# Patient Record
Sex: FEMALE | Race: OTHER | Employment: FULL TIME | URBAN - METROPOLITAN AREA
[De-identification: names, ages, dates, MRNs, and addresses within clinical notes are randomized per-mention and may not be internally consistent; named-entity substitution may affect disease eponyms.]

---

## 2017-01-06 ENCOUNTER — OFFICE VISIT (OUTPATIENT)
Dept: FAMILY MEDICINE CLINIC | Facility: CLINIC | Age: 37
End: 2017-01-06

## 2017-01-06 VITALS
HEART RATE: 59 BPM | TEMPERATURE: 98 F | SYSTOLIC BLOOD PRESSURE: 105 MMHG | BODY MASS INDEX: 23 KG/M2 | WEIGHT: 136 LBS | DIASTOLIC BLOOD PRESSURE: 75 MMHG

## 2017-01-06 DIAGNOSIS — J01.01 ACUTE RECURRENT MAXILLARY SINUSITIS: Primary | ICD-10-CM

## 2017-01-06 PROCEDURE — 99213 OFFICE O/P EST LOW 20 MIN: CPT | Performed by: FAMILY MEDICINE

## 2017-01-06 PROCEDURE — 99212 OFFICE O/P EST SF 10 MIN: CPT | Performed by: FAMILY MEDICINE

## 2017-01-06 RX ORDER — FLUTICASONE PROPIONATE 50 MCG
2 SPRAY, SUSPENSION (ML) NASAL DAILY
Qty: 1 BOTTLE | Refills: 0 | Status: SHIPPED | OUTPATIENT
Start: 2017-01-06 | End: 2017-11-17

## 2017-01-06 RX ORDER — LEVOFLOXACIN 500 MG/1
500 TABLET, FILM COATED ORAL DAILY
Qty: 10 TABLET | Refills: 0 | Status: SHIPPED | OUTPATIENT
Start: 2017-01-06 | End: 2017-01-16

## 2017-01-06 NOTE — PROGRESS NOTES
HPI:    Patient ID: Micheline Chung is a 39year old female. HPI  Patient presents with:  URI  Headache and pressure. Review of Systems   Constitutional: Negative. HENT: Positive for congestion, ear pain, postnasal drip and sinus pressure.     Respirato

## 2017-05-25 RX ORDER — NORETHINDRONE ACETATE/ETHINYL ESTRADIOL AND FERROUS FUMARATE 1MG-20(21)
KIT ORAL
Qty: 28 TABLET | Refills: 2 | OUTPATIENT
Start: 2017-05-25

## 2017-09-05 ENCOUNTER — TELEPHONE (OUTPATIENT)
Dept: OBGYN CLINIC | Facility: CLINIC | Age: 37
End: 2017-09-05

## 2017-09-05 RX ORDER — NORETHINDRONE ACETATE AND ETHINYL ESTRADIOL 1MG-20(21)
1 KIT ORAL DAILY
Qty: 1 PACKAGE | Refills: 1 | Status: SHIPPED | OUTPATIENT
Start: 2017-09-05 | End: 2017-10-11

## 2017-09-05 NOTE — TELEPHONE ENCOUNTER
Can give 2 packs only -- needs to come in during 2nd pack for BP check w/ us & if normal may give 2 more packs to last until annual due

## 2017-09-05 NOTE — TELEPHONE ENCOUNTER
Pt was seen for annual exam on 12/23/16 and was given a RX x4 months for Loestrin Fe 1/20. Pt was told by [facility] to f/u in office for BP check before future refills would be addressed. Pt never RTC for BP check.  Pt stated she has been off of Loestrin since Ap

## 2017-09-05 NOTE — TELEPHONE ENCOUNTER
Pt advised of below recs per [facility] and states understanding. Pt requesting first appt or last appt of day preferably at or after 4pm. Available appts offered with last appt of the day at 3:45pm. Pt declined appts for week of 10/1.  Pt requesting appt for 10/

## 2017-09-05 NOTE — TELEPHONE ENCOUNTER
States that ONOFRE told pt to call back to speak to her about bc, to see how shes doing on it before she request a refill.

## 2017-10-06 NOTE — TELEPHONE ENCOUNTER
Called pt to notify we have to reschedule her b/p check appt on 10/9 at 3:45 pm because we do not have physicians in clinic that afternoon. Pt accepted to reschedule appt on Sat 10/7 at 10 am. Pt confirms she is currently on OCP active pills.  Apologized to

## 2017-10-07 ENCOUNTER — TELEPHONE (OUTPATIENT)
Dept: OBGYN CLINIC | Facility: CLINIC | Age: 37
End: 2017-10-07

## 2017-10-07 ENCOUNTER — NURSE ONLY (OUTPATIENT)
Dept: OBGYN CLINIC | Facility: CLINIC | Age: 37
End: 2017-10-07

## 2017-10-07 VITALS
BODY MASS INDEX: 23 KG/M2 | DIASTOLIC BLOOD PRESSURE: 71 MMHG | SYSTOLIC BLOOD PRESSURE: 103 MMHG | WEIGHT: 137.81 LBS | HEART RATE: 80 BPM

## 2017-10-07 DIAGNOSIS — Z01.30 BP CHECK: Primary | ICD-10-CM

## 2017-10-07 PROCEDURE — 99211 OFF/OP EST MAY X REQ PHY/QHP: CPT | Performed by: OBSTETRICS & GYNECOLOGY

## 2017-10-07 NOTE — PROGRESS NOTES
Patient came in today for a BP check, patient is taking OCP, patient stated she felt fine, denies any headaches or dizziness, pt's BP reading was 103/71 today,patient also states that she needed a refill for OCP for the rest of the year pt has enough to co

## 2017-10-11 RX ORDER — NORETHINDRONE ACETATE AND ETHINYL ESTRADIOL 1MG-20(21)
1 KIT ORAL DAILY
Qty: 3 PACKAGE | Refills: 0 | Status: SHIPPED | OUTPATIENT
Start: 2017-10-11 | End: 2017-11-17

## 2017-10-11 NOTE — TELEPHONE ENCOUNTER
3 packs of OCPs sent to pt's pharmacy. Pt aware to come back in December for annual to get one year refill.

## 2017-11-17 ENCOUNTER — OFFICE VISIT (OUTPATIENT)
Dept: OBGYN CLINIC | Facility: CLINIC | Age: 37
End: 2017-11-17

## 2017-11-17 VITALS
BODY MASS INDEX: 23.73 KG/M2 | WEIGHT: 139 LBS | HEART RATE: 64 BPM | DIASTOLIC BLOOD PRESSURE: 81 MMHG | HEIGHT: 64.25 IN | SYSTOLIC BLOOD PRESSURE: 124 MMHG

## 2017-11-17 DIAGNOSIS — Z01.419 ENCOUNTER FOR GYNECOLOGICAL EXAMINATION WITHOUT ABNORMAL FINDING: Primary | ICD-10-CM

## 2017-11-17 DIAGNOSIS — Z30.41 ORAL CONTRACEPTIVE PILL SURVEILLANCE: ICD-10-CM

## 2017-11-17 PROCEDURE — 99395 PREV VISIT EST AGE 18-39: CPT | Performed by: OBSTETRICS & GYNECOLOGY

## 2017-11-17 RX ORDER — NORETHINDRONE ACETATE AND ETHINYL ESTRADIOL 1MG-20(21)
1 KIT ORAL DAILY
Qty: 1 PACKAGE | Refills: 12 | Status: SHIPPED | OUTPATIENT
Start: 2017-11-17 | End: 2018-12-11

## 2017-11-17 RX ORDER — PYRIDOXINE HCL (VITAMIN B6) 50 MG
TABLET ORAL
COMMUNITY

## 2017-11-17 NOTE — PROGRESS NOTES
Amanda Tidwell is a 44year old female [id number] Patient's last menstrual period was 11/03/2017. Patient presents with:  Gyn Exam: Annual -- never saw therapist. Wishes for names again. noted decrease in mood Sx when exercises  Refill Request: ocp refill  . daily., Disp: , Rfl:   •  Cyanocobalamin (B-12) 100 MCG Oral Tab, Take by mouth., Disp: , Rfl:   •  S-Adenosylmethionine (SAME OR), Take by mouth., Disp: , Rfl:   •  Norethin Ace-Eth Estrad-FE (LOESTRIN FE 1/20) 1-20 MG-MCG Oral Tab, Take 1 tablet by mouth or tenderness  Vagina:    normal appearance without lesions, no abnormal discharge  Cervix:    normal without tenderness on motion  Uterus:    normal in size, contour, position, mobility, without tenderness  Adnexa:   normal without masses or tenderness  P

## 2018-05-30 ENCOUNTER — OFFICE VISIT (OUTPATIENT)
Dept: FAMILY MEDICINE CLINIC | Facility: CLINIC | Age: 38
End: 2018-05-30

## 2018-05-30 VITALS
WEIGHT: 129 LBS | TEMPERATURE: 99 F | SYSTOLIC BLOOD PRESSURE: 106 MMHG | BODY MASS INDEX: 21.75 KG/M2 | HEART RATE: 74 BPM | HEIGHT: 64.5 IN | OXYGEN SATURATION: 98 % | DIASTOLIC BLOOD PRESSURE: 80 MMHG

## 2018-05-30 DIAGNOSIS — R09.82 POSTNASAL DRIP: Primary | ICD-10-CM

## 2018-05-30 DIAGNOSIS — R53.83 FATIGUE, UNSPECIFIED TYPE: ICD-10-CM

## 2018-05-30 PROCEDURE — 85018 HEMOGLOBIN: CPT

## 2018-05-30 PROCEDURE — 36416 COLLJ CAPILLARY BLOOD SPEC: CPT

## 2018-05-30 PROCEDURE — 36415 COLL VENOUS BLD VENIPUNCTURE: CPT

## 2018-05-30 PROCEDURE — 84443 ASSAY THYROID STIM HORMONE: CPT

## 2018-05-30 PROCEDURE — 99202 OFFICE O/P NEW SF 15 MIN: CPT

## 2018-05-30 NOTE — PROGRESS NOTES
HPI:    Patient ID: Merrill Martin is a 44year old female. Tired   This is a new problem. The current episode started yesterday. The problem occurs constantly. The problem has been unchanged.  Associated symptoms include anorexia, congestion, coughing and Disp:  Rfl:    Norethin Ace-Eth Estrad-FE (LOESTRIN FE 1/20) 1-20 MG-MCG Oral Tab Take 1 tablet by mouth daily.  Disp: 1 Package Rfl: 12     Allergies:No Known Allergies   PHYSICAL EXAM:   Physical Exam   Constitutional: She is oriented to person, place, an

## 2018-12-06 RX ORDER — NORETHINDRONE ACETATE/ETHINYL ESTRADIOL AND FERROUS FUMARATE 1MG-20(21)
KIT ORAL
Qty: 28 TABLET | Refills: 11 | OUTPATIENT
Start: 2018-12-06

## 2018-12-11 RX ORDER — NORETHINDRONE ACETATE AND ETHINYL ESTRADIOL 1MG-20(21)
1 KIT ORAL DAILY
Qty: 84 TABLET | Refills: 0 | Status: SHIPPED | OUTPATIENT
Start: 2018-12-11 | End: 2019-01-14

## 2018-12-11 NOTE — TELEPHONE ENCOUNTER
LAST ANNUAL 11-17-17, LAST PAP 12-23-16 (NORMAL). ADVISED PT SHE NEEDS TO SCHEDULE AN ANNUAL EXAM AND ASSISTED HER WITH THE APPT.   PT AWARE WE WILL SEND AUTHORIZATION FOR 90 SUPPLY AND THAT SHE MUST KEEP HER APPT AS SHE WILL GET NO FURTHER REFILLS WITHOUT

## 2019-01-14 ENCOUNTER — OFFICE VISIT (OUTPATIENT)
Dept: OBGYN CLINIC | Facility: CLINIC | Age: 39
End: 2019-01-14
Payer: COMMERCIAL

## 2019-01-14 VITALS
WEIGHT: 132.63 LBS | SYSTOLIC BLOOD PRESSURE: 123 MMHG | HEART RATE: 64 BPM | DIASTOLIC BLOOD PRESSURE: 85 MMHG | BODY MASS INDEX: 22.64 KG/M2 | HEIGHT: 64 IN

## 2019-01-14 DIAGNOSIS — Z30.41 ORAL CONTRACEPTIVE PILL SURVEILLANCE: ICD-10-CM

## 2019-01-14 DIAGNOSIS — Z01.419 ENCOUNTER FOR GYNECOLOGICAL EXAMINATION WITHOUT ABNORMAL FINDING: Primary | ICD-10-CM

## 2019-01-14 PROCEDURE — 99395 PREV VISIT EST AGE 18-39: CPT | Performed by: OBSTETRICS & GYNECOLOGY

## 2019-01-14 RX ORDER — NORETHINDRONE ACETATE AND ETHINYL ESTRADIOL 1MG-20(21)
1 KIT ORAL DAILY
Qty: 84 TABLET | Refills: 3 | Status: SHIPPED | OUTPATIENT
Start: 2019-01-14 | End: 2020-01-25

## 2019-01-21 NOTE — PROGRESS NOTES
Tiara Matute is a 43year old female [id number] Patient's last menstrual period was 2019.  Patient presents with:  Gyn Exam: ANNUAL  Refill Request: BC  .    OBSTETRICS HISTORY:  OB History    Para Term  AB Living   2 2 2     2   SAB TAB Ec Not on file      FAMILY HISTORY:  Family History   Problem Relation Age of Onset   • Diabetes Maternal Grandmother    • Diabetes Maternal Grandfather    • Cancer Maternal Grandfather    • Diabetes Paternal Grandmother    • Breast Cancer Paternal Tess Blanca thyromegaly, no nodules, no adenopathy  Lymphatic: no abnormal supraclavicular or axillary adenopathy is noted  Breast:   normal without palpable masses, tenderness, asymmetry, nipple discharge, nipple retraction or skin changes  Abdomen:   soft, nontender

## 2020-01-06 ENCOUNTER — HOSPITAL ENCOUNTER (OUTPATIENT)
Age: 40
Discharge: STILL ADMITTED OR ADMINISTRATIVE | End: 2020-01-06
Payer: COMMERCIAL

## 2020-01-06 ENCOUNTER — OFFICE VISIT (OUTPATIENT)
Dept: INTERNAL MEDICINE CLINIC | Facility: CLINIC | Age: 40
End: 2020-01-06
Payer: COMMERCIAL

## 2020-01-06 ENCOUNTER — HOSPITAL ENCOUNTER (OUTPATIENT)
Dept: GENERAL RADIOLOGY | Facility: HOSPITAL | Age: 40
Discharge: STILL ADMITTED OR ADMINISTRATIVE | End: 2020-01-06
Attending: INTERNAL MEDICINE
Payer: COMMERCIAL

## 2020-01-06 VITALS
HEART RATE: 67 BPM | HEIGHT: 64 IN | DIASTOLIC BLOOD PRESSURE: 86 MMHG | BODY MASS INDEX: 22.13 KG/M2 | WEIGHT: 129.63 LBS | SYSTOLIC BLOOD PRESSURE: 130 MMHG | TEMPERATURE: 98 F

## 2020-01-06 DIAGNOSIS — M79.672 LEFT FOOT PAIN: Primary | ICD-10-CM

## 2020-01-06 DIAGNOSIS — M79.672 LEFT FOOT PAIN: ICD-10-CM

## 2020-01-06 DIAGNOSIS — J06.9 ACUTE URI: ICD-10-CM

## 2020-01-06 PROCEDURE — 99213 OFFICE O/P EST LOW 20 MIN: CPT | Performed by: INTERNAL MEDICINE

## 2020-01-06 PROCEDURE — 73630 X-RAY EXAM OF FOOT: CPT | Performed by: INTERNAL MEDICINE

## 2020-01-07 ENCOUNTER — TELEPHONE (OUTPATIENT)
Dept: INTERNAL MEDICINE CLINIC | Facility: CLINIC | Age: 40
End: 2020-01-07

## 2020-01-07 NOTE — TELEPHONE ENCOUNTER
Recommend ice application 3 times daily, OTC Aleve 220 mg 1-2 pills twice daily with food, and use of an Ace wrap.

## 2020-01-07 NOTE — PATIENT INSTRUCTIONS
Await results of left foot x-ray. Please rest and elevate your left foot, apply ice 2-3 times daily, and take ibuprofen as needed for pain relief. Treat respiratory symptoms with pseudoephedrine, warm salt water gargles and Robitussin as needed.   Call if

## 2020-01-07 NOTE — PROGRESS NOTES
Tony Crystal is a 35year old female. Patient presents with: Foot Pain: left foot  Cough    HPI:   On Saturday, 2 days ago, she slipped while going downstairs and injured her left foot.   Since that time she has had persistent swelling, bruising and pain a wheezes  EXTREMITIES: Purplish ecchymosis without significant soft tissue swelling affecting the lateral aspect of the dorsal surface of the left foot with diffuse moderate tenderness without palpable abnormality    ASSESSMENT AND PLAN:   1.  Left foot pain

## 2020-01-07 NOTE — TELEPHONE ENCOUNTER
Patient calling for x-ray results of left foot. Patient notified of x-ray results below. Patient however states she is still having a lot of pain in the left foot.    Patient as elevated the foot and is also applying an ace wrap as recommended yesterd

## 2020-01-07 NOTE — TELEPHONE ENCOUNTER
Result Notes for XR FOOT, COMPLETE (MIN 3 VIEWS), LEFT (CPT=73630)     Notes recorded by Robbi Foster MD on 1/7/2020 at 8:01 AM CST  X-ray left foot negative

## 2020-01-07 NOTE — TELEPHONE ENCOUNTER
Pt returned call, reviewed 1/7/20 orders by Dr Tracy Patton. Pt verbalized understanding and agreed with md plan.

## 2020-01-25 RX ORDER — NORETHINDRONE ACETATE AND ETHINYL ESTRADIOL AND FERROUS FUMARATE 1MG-20(21)
KIT ORAL
Qty: 84 TABLET | Refills: 0 | Status: SHIPPED | OUTPATIENT
Start: 2020-01-25 | End: 2020-02-17

## 2020-01-25 NOTE — TELEPHONE ENCOUNTER
LAST ANNUAL 12-14-19, LAST PAP 12-23-16 AND NEXT ANNUAL SCHEDULED 2-17-20. AUTHORIZATION FOR #84 SENT TO THE PHARMACY PER PROTOCOL.

## 2020-02-17 ENCOUNTER — OFFICE VISIT (OUTPATIENT)
Dept: OBGYN CLINIC | Facility: CLINIC | Age: 40
End: 2020-02-17
Payer: COMMERCIAL

## 2020-02-17 VITALS
SYSTOLIC BLOOD PRESSURE: 117 MMHG | HEIGHT: 65 IN | DIASTOLIC BLOOD PRESSURE: 82 MMHG | BODY MASS INDEX: 21.49 KG/M2 | WEIGHT: 129 LBS | HEART RATE: 76 BPM

## 2020-02-17 DIAGNOSIS — Z12.31 VISIT FOR SCREENING MAMMOGRAM: ICD-10-CM

## 2020-02-17 DIAGNOSIS — Z01.419 ENCOUNTER FOR GYNECOLOGICAL EXAMINATION WITHOUT ABNORMAL FINDING: Primary | ICD-10-CM

## 2020-02-17 PROCEDURE — 99396 PREV VISIT EST AGE 40-64: CPT | Performed by: OBSTETRICS & GYNECOLOGY

## 2020-02-17 RX ORDER — NORETHINDRONE ACETATE AND ETHINYL ESTRADIOL 1MG-20(21)
1 KIT ORAL
Qty: 84 TABLET | Refills: 3 | Status: SHIPPED | OUTPATIENT
Start: 2020-02-17 | End: 2021-03-23

## 2020-02-19 NOTE — PROGRESS NOTES
Chet Pizarro is a 35year old female [id number] Patient's last menstrual period was 2020. Patient presents with:  Gyn Exam: annual  Medication Request: ocp refill  .     OBSTETRICS HISTORY:  OB History    Para Term  AB Living   2 2 2     2 service: Not on file        Active member of club or organization: Not on file        Attends meetings of clubs or organizations: Not on file        Relationship status: Not on file      Intimate partner violence:        Fear of current or ex partner: Not any breast pain, lumps, or discharge  Neurological:    denies frequent severe headaches  Psychiatric:   denies depression or anxiety, thoughts of harming self or others  Heme/Lymph:    denies easy bruising or bleeding      PHYSICAL EXAM:   Blood pressure 1 Annual visits.

## 2020-02-21 ENCOUNTER — LAB ENCOUNTER (OUTPATIENT)
Dept: LAB | Facility: HOSPITAL | Age: 40
End: 2020-02-21
Attending: INTERNAL MEDICINE
Payer: COMMERCIAL

## 2020-02-21 DIAGNOSIS — G47.9 SLEEP DISORDER: Primary | ICD-10-CM

## 2020-02-21 DIAGNOSIS — Z00.00 ROUTINE GENERAL MEDICAL EXAMINATION AT A HEALTH CARE FACILITY: ICD-10-CM

## 2020-02-21 LAB
ALBUMIN SERPL-MCNC: 3.7 G/DL (ref 3.4–5)
ALBUMIN/GLOB SERPL: 1 {RATIO} (ref 1–2)
ALP LIVER SERPL-CCNC: 38 U/L (ref 37–98)
ALT SERPL-CCNC: 35 U/L (ref 13–56)
ANION GAP SERPL CALC-SCNC: 8 MMOL/L (ref 0–18)
AST SERPL-CCNC: 23 U/L (ref 15–37)
BASOPHILS # BLD AUTO: 0.03 X10(3) UL (ref 0–0.2)
BASOPHILS NFR BLD AUTO: 0.4 %
BILIRUB SERPL-MCNC: 0.5 MG/DL (ref 0.1–2)
BUN BLD-MCNC: 7 MG/DL (ref 7–18)
BUN/CREAT SERPL: 9.9 (ref 10–20)
CALCIUM BLD-MCNC: 9.1 MG/DL (ref 8.5–10.1)
CHLORIDE SERPL-SCNC: 107 MMOL/L (ref 98–112)
CHOLEST SMN-MCNC: 146 MG/DL (ref ?–200)
CO2 SERPL-SCNC: 24 MMOL/L (ref 21–32)
CREAT BLD-MCNC: 0.71 MG/DL (ref 0.55–1.02)
DEPRECATED RDW RBC AUTO: 43.8 FL (ref 35.1–46.3)
EOSINOPHIL # BLD AUTO: 0.09 X10(3) UL (ref 0–0.7)
EOSINOPHIL NFR BLD AUTO: 1.1 %
ERYTHROCYTE [DISTWIDTH] IN BLOOD BY AUTOMATED COUNT: 12.3 % (ref 11–15)
EST. AVERAGE GLUCOSE BLD GHB EST-MCNC: 103 MG/DL (ref 68–126)
GLOBULIN PLAS-MCNC: 3.6 G/DL (ref 2.8–4.4)
GLUCOSE BLD-MCNC: 85 MG/DL (ref 70–99)
HBA1C MFR BLD HPLC: 5.2 % (ref ?–5.7)
HCT VFR BLD AUTO: 38.6 % (ref 35–48)
HDLC SERPL-MCNC: 73 MG/DL (ref 40–59)
HGB BLD-MCNC: 13.3 G/DL (ref 12–16)
IMM GRANULOCYTES # BLD AUTO: 0.03 X10(3) UL (ref 0–1)
IMM GRANULOCYTES NFR BLD: 0.4 %
LDLC SERPL CALC-MCNC: 53 MG/DL (ref ?–100)
LYMPHOCYTES # BLD AUTO: 2.19 X10(3) UL (ref 1–4)
LYMPHOCYTES NFR BLD AUTO: 27.7 %
M PROTEIN MFR SERPL ELPH: 7.3 G/DL (ref 6.4–8.2)
MCH RBC QN AUTO: 33.9 PG (ref 26–34)
MCHC RBC AUTO-ENTMCNC: 34.5 G/DL (ref 31–37)
MCV RBC AUTO: 98.5 FL (ref 80–100)
MONOCYTES # BLD AUTO: 0.4 X10(3) UL (ref 0.1–1)
MONOCYTES NFR BLD AUTO: 5.1 %
NEUTROPHILS # BLD AUTO: 5.16 X10 (3) UL (ref 1.5–7.7)
NEUTROPHILS # BLD AUTO: 5.16 X10(3) UL (ref 1.5–7.7)
NEUTROPHILS NFR BLD AUTO: 65.3 %
NONHDLC SERPL-MCNC: 73 MG/DL (ref ?–130)
OSMOLALITY SERPL CALC.SUM OF ELEC: 285 MOSM/KG (ref 275–295)
PATIENT FASTING Y/N/NP: YES
PATIENT FASTING Y/N/NP: YES
PLATELET # BLD AUTO: 349 10(3)UL (ref 150–450)
POTASSIUM SERPL-SCNC: 4.3 MMOL/L (ref 3.5–5.1)
RBC # BLD AUTO: 3.92 X10(6)UL (ref 3.8–5.3)
SODIUM SERPL-SCNC: 139 MMOL/L (ref 136–145)
TRIGL SERPL-MCNC: 98 MG/DL (ref 30–149)
TSI SER-ACNC: 3.03 MIU/ML (ref 0.36–3.74)
VLDLC SERPL CALC-MCNC: 20 MG/DL (ref 0–30)
WBC # BLD AUTO: 7.9 X10(3) UL (ref 4–11)

## 2020-02-21 PROCEDURE — 85025 COMPLETE CBC W/AUTO DIFF WBC: CPT

## 2020-02-21 PROCEDURE — 80061 LIPID PANEL: CPT

## 2020-02-21 PROCEDURE — 84443 ASSAY THYROID STIM HORMONE: CPT

## 2020-02-21 PROCEDURE — 83036 HEMOGLOBIN GLYCOSYLATED A1C: CPT

## 2020-02-21 PROCEDURE — 80053 COMPREHEN METABOLIC PANEL: CPT

## 2020-02-21 PROCEDURE — 36415 COLL VENOUS BLD VENIPUNCTURE: CPT

## 2020-02-24 NOTE — PROGRESS NOTES
Claribel Handy is a 35year old female.   Patient presents with:  Establish Care      HPI:   Patient is a 48-year-old woman, comes as a new patient  C/C establish care  C/O sleep issues   Gyn dr Mariel Garza to [facility] in nov -works In the [address] [place] [address] color of urine, discharge, urinating frequently  MUS: No back pain, joint pain, muscle pain  NEURO: denies headaches , anxiety, depression    EXAM:   /89 (BP Location: Right arm, Patient Position: Sitting, Cuff Size: adult)   Pulse 68   Ht 5' 5\" (1.

## 2020-02-24 NOTE — PATIENT INSTRUCTIONS
Treating Anxiety Disorders with Therapy    If you have an anxiety disorder, you don’t have to suffer anymore. Treatment is available. Therapy (also called counseling) is often a helpful treatment for anxiety disorders.  With therapy, a specially trained darvin Therapy will help you feel better and teach you skills to help manage anxiety long term. But change doesn’t happen right away. It takes a commitment from you. And treatment only works if you learn to face the causes of your anxiety.  So, you might feel wors © 9135-4492 [employer] [address]. [address] [facility], [address] [place]. All rights reserved. This information is not intended as a substitute for professional medical care. Always follow your healthcare professional's instructions.         Cleta Libman · Generalized anxiety disorder. This causes constant worry that can greatly disrupt your life. Getting better  You may believe that nothing can help you. Or, you might fear what others may think. But most anxiety symptoms can be eased.  Having an anxiety ? Major life changes, both good (new baby or job promotion) and bad (loss of job or loss of loved one)  ? Overload: feeling that you have too many responsibilities and can't take care of all of them at once  ?  Feeling helpless or feeling that your problems © 8306-5543 [employer] [address]. [address] [facility], [facility] [place]. All rights reserved. This information is not intended as a substitute for professional medical care. Always follow your healthcare professional's instructions.

## 2021-01-27 ENCOUNTER — HOSPITAL ENCOUNTER (EMERGENCY)
Facility: HOSPITAL | Age: 41
Discharge: HOME | End: 2021-01-27
Payer: COMMERCIAL

## 2021-01-27 ENCOUNTER — APPOINTMENT (OUTPATIENT)
Dept: GENERAL RADIOLOGY | Facility: HOSPITAL | Age: 41
End: 2021-01-27
Payer: COMMERCIAL

## 2021-01-27 VITALS
HEIGHT: 64 IN | OXYGEN SATURATION: 100 % | SYSTOLIC BLOOD PRESSURE: 109 MMHG | TEMPERATURE: 99 F | DIASTOLIC BLOOD PRESSURE: 74 MMHG | BODY MASS INDEX: 22.88 KG/M2 | WEIGHT: 134 LBS | HEART RATE: 61 BPM | RESPIRATION RATE: 18 BRPM

## 2021-01-27 DIAGNOSIS — S82.041A CLOSED DISPLACED COMMINUTED FRACTURE OF RIGHT PATELLA, INITIAL ENCOUNTER: Primary | ICD-10-CM

## 2021-01-27 PROCEDURE — 73560 X-RAY EXAM OF KNEE 1 OR 2: CPT

## 2021-01-27 PROCEDURE — 99283 EMERGENCY DEPT VISIT LOW MDM: CPT

## 2021-01-27 PROCEDURE — 73562 X-RAY EXAM OF KNEE 3: CPT

## 2021-01-27 RX ORDER — HYDROCODONE BITARTRATE AND ACETAMINOPHEN 5; 325 MG/1; MG/1
1-2 TABLET ORAL EVERY 6 HOURS PRN
Qty: 14 TABLET | Refills: 0 | Status: ON HOLD | OUTPATIENT
Start: 2021-01-27 | End: 2021-02-01

## 2021-01-27 RX ORDER — HYDROCODONE BITARTRATE AND ACETAMINOPHEN 5; 325 MG/1; MG/1
1 TABLET ORAL ONCE
Status: COMPLETED | OUTPATIENT
Start: 2021-01-27 | End: 2021-01-27

## 2021-01-28 ENCOUNTER — TELEPHONE (OUTPATIENT)
Dept: ORTHOPEDICS CLINIC | Facility: CLINIC | Age: 41
End: 2021-01-28

## 2021-01-28 ENCOUNTER — OFFICE VISIT (OUTPATIENT)
Dept: ORTHOPEDICS CLINIC | Facility: CLINIC | Age: 41
End: 2021-01-28
Payer: COMMERCIAL

## 2021-01-28 VITALS — WEIGHT: 134 LBS | BODY MASS INDEX: 22.33 KG/M2 | HEIGHT: 65 IN

## 2021-01-28 DIAGNOSIS — S82.031A CLOSED DISPLACED TRANSVERSE FRACTURE OF RIGHT PATELLA, INITIAL ENCOUNTER: Primary | ICD-10-CM

## 2021-01-28 DIAGNOSIS — S82.041A CLOSED DISPLACED COMMINUTED FRACTURE OF RIGHT PATELLA, INITIAL ENCOUNTER: Primary | ICD-10-CM

## 2021-01-28 PROCEDURE — 99204 OFFICE O/P NEW MOD 45 MIN: CPT | Performed by: ORTHOPAEDIC SURGERY

## 2021-01-28 PROCEDURE — 3008F BODY MASS INDEX DOCD: CPT | Performed by: ORTHOPAEDIC SURGERY

## 2021-01-28 RX ORDER — IBUPROFEN 800 MG/1
800 TABLET ORAL EVERY 6 HOURS PRN
COMMUNITY
Start: 2021-01-09

## 2021-01-28 NOTE — ED PROVIDER NOTES
Patient Seen in: [facility] AND [facility] Emergency Department      History   Patient presents with:  Leg or Foot Injury    Stated Complaint: Mechanical Fall w/pain to RLE     HPI/Subjective:   HPI    34-year-old female presents to the emergency department afte Skin:     General: Skin is warm and dry. Capillary Refill: Capillary refill takes less than 2 seconds. Comments: Small abrasion to the right anterior knee   Neurological:      General: No focal deficit present. Mental Status: She is alert.

## 2021-01-28 NOTE — TELEPHONE ENCOUNTER
Type of surgery:  Right patella open reduction, internal fixation  Date: 2/1/2021  Location: [facility]  Medical Clearance: No - Per Dr Chinyere Morales     *Medical:      *Dental:      *Other:  Prior Authorization Status: Approved  Workers Comp:  Medacta/Jus:  Laura Sloan

## 2021-01-29 ENCOUNTER — LAB ENCOUNTER (OUTPATIENT)
Dept: LAB | Facility: HOSPITAL | Age: 41
End: 2021-01-29
Attending: ORTHOPAEDIC SURGERY
Payer: COMMERCIAL

## 2021-01-29 DIAGNOSIS — Z01.818 PREOP TESTING: ICD-10-CM

## 2021-01-29 NOTE — PROGRESS NOTES
NURSING INTAKE COMMENTS: Patient presents with:  Consult: right knee injury -- XR taken yesterday at [address] [place] [address] ED. Onset last night from slipping and falling at home in kitchen. Rates pain 8/10. Wearing  knee splint and has crutches.        HPI: This 39year old Grandmother    • Cancer Paternal Grandfather         stomach CA   • No Known Problems Father    • No Known Problems Mother    • No Known Problems Daughter    • No Known Problems Son        Social History    Occupational History      Not on file    Tobacco No calf tenderness or significant swelling. Neurological light touch and pinprick sensation intact throughout the lower extremities. Ankle dorsiflexion plantarflexion EHL knee extension and hip flexion strength are 5 out of 5 bilaterally. No clonus. immobilizer. Follow Up: No follow-ups on file.     Deb Varma MD

## 2021-01-30 LAB — SARS-COV-2 RNA RESP QL NAA+PROBE: NOT DETECTED

## 2021-01-31 NOTE — H&P
ORTHO SURGERY H&P  Sadie Villegas is a 38year old female. MRN is [id number]. CC: Right knee pain    HPI: 48-year-old female complains of right knee pain, injured her knee while playing with her children at home.   She struck the anterior aspect of her ri file        Relationship status: Not on file      Intimate partner violence        Fear of current or ex partner: Not on file        Emotionally abused: Not on file        Physically abused: Not on file        Forced sexual activity: Not on file    Other T kg)   LMP 01/06/2021   BMI 21.97 kg/m²     Physical Exam:   Alert, oriented, no acute distress. Well-nourished. Skin is intact. No erythema, or lesions. Small abrasion over the anterior aspect of her knee, no erythema no sign of infection.   Moderate circu fracture through the mid pole of the patella. There is a gap of about 1.7 centimeters between the distracted principal fragments. Displaced fragments demonstrate mild apex anterior angulation. No other fractures are noted.  SOFT TISSUES: Mild anterior so

## 2021-02-01 ENCOUNTER — PATIENT MESSAGE (OUTPATIENT)
Dept: ORTHOPEDICS CLINIC | Facility: CLINIC | Age: 41
End: 2021-02-01

## 2021-02-01 ENCOUNTER — APPOINTMENT (OUTPATIENT)
Dept: GENERAL RADIOLOGY | Facility: HOSPITAL | Age: 41
End: 2021-02-01
Attending: ORTHOPAEDIC SURGERY
Payer: COMMERCIAL

## 2021-02-01 ENCOUNTER — HOSPITAL ENCOUNTER (OUTPATIENT)
Facility: HOSPITAL | Age: 41
Setting detail: HOSPITAL OUTPATIENT SURGERY
Discharge: HOME | End: 2021-02-01
Attending: ORTHOPAEDIC SURGERY | Admitting: ORTHOPAEDIC SURGERY
Payer: COMMERCIAL

## 2021-02-01 ENCOUNTER — ANESTHESIA EVENT (OUTPATIENT)
Dept: SURGERY | Facility: HOSPITAL | Age: 41
End: 2021-02-01
Payer: COMMERCIAL

## 2021-02-01 ENCOUNTER — ANESTHESIA (OUTPATIENT)
Dept: SURGERY | Facility: HOSPITAL | Age: 41
End: 2021-02-01
Payer: COMMERCIAL

## 2021-02-01 VITALS
TEMPERATURE: 98 F | WEIGHT: 134 LBS | BODY MASS INDEX: 22.33 KG/M2 | DIASTOLIC BLOOD PRESSURE: 71 MMHG | RESPIRATION RATE: 16 BRPM | SYSTOLIC BLOOD PRESSURE: 115 MMHG | OXYGEN SATURATION: 99 % | HEIGHT: 65 IN | HEART RATE: 74 BPM

## 2021-02-01 DIAGNOSIS — Z01.818 PREOP TESTING: Primary | ICD-10-CM

## 2021-02-01 LAB — B-HCG UR QL: NEGATIVE

## 2021-02-01 PROCEDURE — 36415 COLL VENOUS BLD VENIPUNCTURE: CPT | Performed by: ORTHOPAEDIC SURGERY

## 2021-02-01 PROCEDURE — 64447 NJX AA&/STRD FEMORAL NRV IMG: CPT | Performed by: ORTHOPAEDIC SURGERY

## 2021-02-01 PROCEDURE — 76000 FLUOROSCOPY <1 HR PHYS/QHP: CPT | Performed by: ORTHOPAEDIC SURGERY

## 2021-02-01 PROCEDURE — 76942 ECHO GUIDE FOR BIOPSY: CPT | Performed by: STUDENT IN AN ORGANIZED HEALTH CARE EDUCATION/TRAINING PROGRAM

## 2021-02-01 PROCEDURE — 76942 ECHO GUIDE FOR BIOPSY: CPT | Performed by: ORTHOPAEDIC SURGERY

## 2021-02-01 PROCEDURE — 81025 URINE PREGNANCY TEST: CPT

## 2021-02-01 PROCEDURE — 0QSD04Z REPOSITION RIGHT PATELLA WITH INTERNAL FIXATION DEVICE, OPEN APPROACH: ICD-10-PCS | Performed by: ORTHOPAEDIC SURGERY

## 2021-02-01 PROCEDURE — 3E0T3BZ INTRODUCTION OF ANESTHETIC AGENT INTO PERIPHERAL NERVES AND PLEXI, PERCUTANEOUS APPROACH: ICD-10-PCS | Performed by: STUDENT IN AN ORGANIZED HEALTH CARE EDUCATION/TRAINING PROGRAM

## 2021-02-01 DEVICE — IMPLANTABLE DEVICE: Type: IMPLANTABLE DEVICE | Site: PATELLA | Status: FUNCTIONAL

## 2021-02-01 RX ORDER — DEXAMETHASONE SODIUM PHOSPHATE 10 MG/ML
INJECTION, SOLUTION INTRAMUSCULAR; INTRAVENOUS
Status: COMPLETED | OUTPATIENT
Start: 2021-02-01 | End: 2021-02-01

## 2021-02-01 RX ORDER — LIDOCAINE HYDROCHLORIDE 10 MG/ML
INJECTION, SOLUTION INFILTRATION; PERINEURAL
Status: COMPLETED | OUTPATIENT
Start: 2021-02-01 | End: 2021-02-01

## 2021-02-01 RX ORDER — SODIUM CHLORIDE, SODIUM LACTATE, POTASSIUM CHLORIDE, CALCIUM CHLORIDE 600; 310; 30; 20 MG/100ML; MG/100ML; MG/100ML; MG/100ML
INJECTION, SOLUTION INTRAVENOUS CONTINUOUS
Status: DISCONTINUED | OUTPATIENT
Start: 2021-02-01 | End: 2021-02-01

## 2021-02-01 RX ORDER — HYDROMORPHONE HYDROCHLORIDE 1 MG/ML
0.4 INJECTION, SOLUTION INTRAMUSCULAR; INTRAVENOUS; SUBCUTANEOUS EVERY 5 MIN PRN
Status: DISCONTINUED | OUTPATIENT
Start: 2021-02-01 | End: 2021-02-01

## 2021-02-01 RX ORDER — PHENYLEPHRINE HCL 10 MG/ML
VIAL (ML) INJECTION AS NEEDED
Status: DISCONTINUED | OUTPATIENT
Start: 2021-02-01 | End: 2021-02-01 | Stop reason: SURG

## 2021-02-01 RX ORDER — LIDOCAINE HYDROCHLORIDE 10 MG/ML
INJECTION, SOLUTION EPIDURAL; INFILTRATION; INTRACAUDAL; PERINEURAL AS NEEDED
Status: DISCONTINUED | OUTPATIENT
Start: 2021-02-01 | End: 2021-02-01 | Stop reason: SURG

## 2021-02-01 RX ORDER — OXYCODONE HYDROCHLORIDE AND ACETAMINOPHEN 5; 325 MG/1; MG/1
1 TABLET ORAL EVERY 4 HOURS PRN
Status: DISCONTINUED | OUTPATIENT
Start: 2021-02-01 | End: 2021-02-01

## 2021-02-01 RX ORDER — ACETAMINOPHEN 500 MG
1000 TABLET ORAL ONCE
Status: COMPLETED | OUTPATIENT
Start: 2021-02-01 | End: 2021-02-01

## 2021-02-01 RX ORDER — HYDROMORPHONE HYDROCHLORIDE 1 MG/ML
0.6 INJECTION, SOLUTION INTRAMUSCULAR; INTRAVENOUS; SUBCUTANEOUS EVERY 5 MIN PRN
Status: DISCONTINUED | OUTPATIENT
Start: 2021-02-01 | End: 2021-02-01

## 2021-02-01 RX ORDER — CEFAZOLIN SODIUM/WATER 2 G/20 ML
SYRINGE (ML) INTRAVENOUS AS NEEDED
Status: DISCONTINUED | OUTPATIENT
Start: 2021-02-01 | End: 2021-02-01 | Stop reason: SURG

## 2021-02-01 RX ORDER — MORPHINE SULFATE 4 MG/ML
2 INJECTION, SOLUTION INTRAMUSCULAR; INTRAVENOUS EVERY 10 MIN PRN
Status: DISCONTINUED | OUTPATIENT
Start: 2021-02-01 | End: 2021-02-01

## 2021-02-01 RX ORDER — HYDROCODONE BITARTRATE AND ACETAMINOPHEN 5; 325 MG/1; MG/1
2 TABLET ORAL AS NEEDED
Status: DISCONTINUED | OUTPATIENT
Start: 2021-02-01 | End: 2021-02-01

## 2021-02-01 RX ORDER — ONDANSETRON 2 MG/ML
INJECTION INTRAMUSCULAR; INTRAVENOUS AS NEEDED
Status: DISCONTINUED | OUTPATIENT
Start: 2021-02-01 | End: 2021-02-01 | Stop reason: SURG

## 2021-02-01 RX ORDER — PROCHLORPERAZINE EDISYLATE 5 MG/ML
5 INJECTION INTRAMUSCULAR; INTRAVENOUS ONCE AS NEEDED
Status: DISCONTINUED | OUTPATIENT
Start: 2021-02-01 | End: 2021-02-01

## 2021-02-01 RX ORDER — HALOPERIDOL 5 MG/ML
0.25 INJECTION INTRAMUSCULAR ONCE AS NEEDED
Status: DISCONTINUED | OUTPATIENT
Start: 2021-02-01 | End: 2021-02-01

## 2021-02-01 RX ORDER — HYDROMORPHONE HYDROCHLORIDE 1 MG/ML
INJECTION, SOLUTION INTRAMUSCULAR; INTRAVENOUS; SUBCUTANEOUS AS NEEDED
Status: DISCONTINUED | OUTPATIENT
Start: 2021-02-01 | End: 2021-02-01 | Stop reason: SURG

## 2021-02-01 RX ORDER — OXYCODONE HYDROCHLORIDE AND ACETAMINOPHEN 5; 325 MG/1; MG/1
1-2 TABLET ORAL EVERY 4 HOURS PRN
Qty: 30 TABLET | Refills: 0 | Status: SHIPPED | OUTPATIENT
Start: 2021-02-01 | End: 2021-03-11 | Stop reason: ALTCHOICE

## 2021-02-01 RX ORDER — ROPIVACAINE HYDROCHLORIDE 5 MG/ML
INJECTION, SOLUTION EPIDURAL; INFILTRATION; PERINEURAL
Status: COMPLETED | OUTPATIENT
Start: 2021-02-01 | End: 2021-02-01

## 2021-02-01 RX ORDER — MORPHINE SULFATE 10 MG/ML
6 INJECTION, SOLUTION INTRAMUSCULAR; INTRAVENOUS EVERY 10 MIN PRN
Status: DISCONTINUED | OUTPATIENT
Start: 2021-02-01 | End: 2021-02-01

## 2021-02-01 RX ORDER — HYDROCODONE BITARTRATE AND ACETAMINOPHEN 5; 325 MG/1; MG/1
1 TABLET ORAL AS NEEDED
Status: DISCONTINUED | OUTPATIENT
Start: 2021-02-01 | End: 2021-02-01

## 2021-02-01 RX ORDER — NALOXONE HYDROCHLORIDE 0.4 MG/ML
80 INJECTION, SOLUTION INTRAMUSCULAR; INTRAVENOUS; SUBCUTANEOUS AS NEEDED
Status: DISCONTINUED | OUTPATIENT
Start: 2021-02-01 | End: 2021-02-01

## 2021-02-01 RX ORDER — HYDROMORPHONE HYDROCHLORIDE 1 MG/ML
0.2 INJECTION, SOLUTION INTRAMUSCULAR; INTRAVENOUS; SUBCUTANEOUS EVERY 5 MIN PRN
Status: DISCONTINUED | OUTPATIENT
Start: 2021-02-01 | End: 2021-02-01

## 2021-02-01 RX ORDER — MIDAZOLAM HYDROCHLORIDE 1 MG/ML
INJECTION INTRAMUSCULAR; INTRAVENOUS
Status: COMPLETED | OUTPATIENT
Start: 2021-02-01 | End: 2021-02-01

## 2021-02-01 RX ORDER — DEXAMETHASONE SODIUM PHOSPHATE 4 MG/ML
VIAL (ML) INJECTION AS NEEDED
Status: DISCONTINUED | OUTPATIENT
Start: 2021-02-01 | End: 2021-02-01 | Stop reason: SURG

## 2021-02-01 RX ORDER — MORPHINE SULFATE 4 MG/ML
4 INJECTION, SOLUTION INTRAMUSCULAR; INTRAVENOUS EVERY 10 MIN PRN
Status: DISCONTINUED | OUTPATIENT
Start: 2021-02-01 | End: 2021-02-01

## 2021-02-01 RX ORDER — ONDANSETRON 2 MG/ML
4 INJECTION INTRAMUSCULAR; INTRAVENOUS ONCE AS NEEDED
Status: COMPLETED | OUTPATIENT
Start: 2021-02-01 | End: 2021-02-01

## 2021-02-01 RX ADMIN — CEFAZOLIN SODIUM/WATER 2 G: 2 G/20 ML SYRINGE (ML) INTRAVENOUS at 13:45:00

## 2021-02-01 RX ADMIN — PHENYLEPHRINE HCL 50 MCG: 10 MG/ML VIAL (ML) INJECTION at 14:17:00

## 2021-02-01 RX ADMIN — SODIUM CHLORIDE, SODIUM LACTATE, POTASSIUM CHLORIDE, CALCIUM CHLORIDE: 600; 310; 30; 20 INJECTION, SOLUTION INTRAVENOUS at 15:07:00

## 2021-02-01 RX ADMIN — ROPIVACAINE HYDROCHLORIDE 25 ML: 5 INJECTION, SOLUTION EPIDURAL; INFILTRATION; PERINEURAL at 13:25:00

## 2021-02-01 RX ADMIN — DEXAMETHASONE SODIUM PHOSPHATE 4 MG: 4 MG/ML VIAL (ML) INJECTION at 13:41:00

## 2021-02-01 RX ADMIN — LIDOCAINE HYDROCHLORIDE 50 MG: 10 INJECTION, SOLUTION EPIDURAL; INFILTRATION; INTRACAUDAL; PERINEURAL at 13:39:00

## 2021-02-01 RX ADMIN — HYDROMORPHONE HYDROCHLORIDE 0.5 MG: 1 INJECTION, SOLUTION INTRAMUSCULAR; INTRAVENOUS; SUBCUTANEOUS at 15:04:00

## 2021-02-01 RX ADMIN — MIDAZOLAM HYDROCHLORIDE 2 MG: 1 INJECTION INTRAMUSCULAR; INTRAVENOUS at 13:25:00

## 2021-02-01 RX ADMIN — DEXAMETHASONE SODIUM PHOSPHATE 4 MG: 10 INJECTION, SOLUTION INTRAMUSCULAR; INTRAVENOUS at 13:25:00

## 2021-02-01 RX ADMIN — LIDOCAINE HYDROCHLORIDE 5 ML: 10 INJECTION, SOLUTION INFILTRATION; PERINEURAL at 13:25:00

## 2021-02-01 RX ADMIN — ONDANSETRON 4 MG: 2 INJECTION INTRAMUSCULAR; INTRAVENOUS at 13:41:00

## 2021-02-01 NOTE — OPERATIVE REPORT
[facility]    PATIENT'S NAME: APRIL GARCIA   ATTENDING PHYSICIAN: Ariel Flores MD   OPERATING PHYSICIAN: Ariel Flores MD   PATIENT ACCOUNT#:   [id number]    LOCATION:  [address]  MEDICAL RECORD #:   [id number]       DATE Lazarus Heys inflated to 250 mmHg. A longitudinal incision was then made over the anterior aspect of the knee. This was a 6-inch incision centered over the patella. Subcutaneous flaps were fashioned. The fracture site was identified.   Periosteum overlying the anter Tourniquet was deflated. Attention was paid to hemostasis. The skin and subcutaneous layers were closed over the construct using combination of 2-0 Vicryl sutures and 3-0 nylon sutures in interrupted fashion.   Sterile dressing was applied followed by a k

## 2021-02-01 NOTE — ANESTHESIA PREPROCEDURE EVALUATION
Anesthesia PreOp Note    HPI:     Mikle Boas is a 38year old female who presents for preoperative consultation requested by: Antonia Henry MD    Date of Surgery: 2/1/2021    Procedure(s):  PATELLA OPEN REDUCTION INTERNAL FIXATION  Indication: right No Known Allergies    Family History   Problem Relation Age of Onset   • Diabetes Maternal Grandmother    • Diabetes Maternal Grandfather    • Cancer Maternal Grandfather    • Diabetes Paternal Grandmother    • Breast Cancer Paternal Grandmother    • Ova Exercise: Not Asked        Seat Belt: Not Asked        Special Diet: Not Asked        Stress Concern: Not Asked        Weight Concern: Not Asked    Social History Narrative      Not on file      Available pre-op labs reviewed.   Lab Results   Componen

## 2021-02-01 NOTE — ANESTHESIA PROCEDURE NOTES
Peripheral Block    Date/Time: 2/1/2021 1:25 PM  Performed by: Natty Singh MD  Authorized by: Natty Singh MD       General Information and Staff    Start Time:  2/1/2021 1:19 PM  End Time:  2/1/2021 1:25 PM  Anesthesiologist:  Sebastián Tyler

## 2021-02-01 NOTE — ANESTHESIA PROCEDURE NOTES
Airway  Urgency: elective      General Information and Staff    Patient location during procedure: OR  Anesthesiologist: Bora Gomez MD  Performed: anesthesiologist     Indications and Patient Condition  Indications for airway management: anesthe

## 2021-02-01 NOTE — ANESTHESIA POSTPROCEDURE EVALUATION
Patient: Hebert Truong    Procedure Summary     Date: 02/01/21 Room / Location: [facility] OR  / [facility] OR    Anesthesia Start: 0408 Anesthesia Stop: 5802    Procedure: PATELLA OPEN REDUCTION INTERNAL FIXATION (Right Knee) Diagnosis: (right patella fractu

## 2021-02-01 NOTE — OPERATIVE REPORT
Operative Note    Patient Name: Aliza Modi    Preoperative Diagnosis: right patella fracture    Postoperative Diagnosis: right patella fracture    Primary Surgeon: Lilliam Rothman MD     Assistant: Peter Morillo [facility]    Procedures: R patella fracture ORIF with

## 2021-02-02 ENCOUNTER — MOBILE ENCOUNTER (OUTPATIENT)
Dept: ORTHOPEDICS CLINIC | Facility: CLINIC | Age: 41
End: 2021-02-02

## 2021-02-02 NOTE — TELEPHONE ENCOUNTER
From: Tarik Modi  To: Julia Max MD  Sent: 2/1/2021 10:34 PM CST  Subject: Test Results Question    Hi  Would it be possible to see the image of the x-ray after the patellar surgery?   Thanks  Tenisha

## 2021-02-03 ENCOUNTER — TELEPHONE (OUTPATIENT)
Dept: ORTHOPEDICS CLINIC | Facility: CLINIC | Age: 41
End: 2021-02-03

## 2021-02-03 ENCOUNTER — HOSPITAL ENCOUNTER (OUTPATIENT)
Dept: ULTRASOUND IMAGING | Facility: HOSPITAL | Age: 41
Discharge: HOME | End: 2021-02-03
Attending: ORTHOPAEDIC SURGERY
Payer: COMMERCIAL

## 2021-02-03 DIAGNOSIS — M79.89 CALF SWELLING: Primary | ICD-10-CM

## 2021-02-03 DIAGNOSIS — M79.661 RIGHT CALF PAIN: ICD-10-CM

## 2021-02-03 DIAGNOSIS — M79.89 CALF SWELLING: ICD-10-CM

## 2021-02-03 PROCEDURE — 93971 EXTREMITY STUDY: CPT | Performed by: ORTHOPAEDIC SURGERY

## 2021-02-03 NOTE — TELEPHONE ENCOUNTER
S/w pt and she states last night her pain and swelling became severe. She states there is bruising, swelling and pain in the knee, calf, ankle and foot. She states her pain is 9-10/10 without meds and 4/10 with meds. She is resting and elevating and icing s

## 2021-02-03 NOTE — TELEPHONE ENCOUNTER
US called and US is negative for DVT. Pt notified of results. Went over yin's recommendations for pain and swelling again. Advised her to [facility] as needed.

## 2021-02-03 NOTE — TELEPHONE ENCOUNTER
Pt called stating pt had surgery on 2-1-21. Pt's knee is swollen. pt is having pain. Call transferred to the nurse.

## 2021-02-03 NOTE — PROGRESS NOTES
Patient called this evening because of concerns of some feeling of wetness underneath her dressing. She is status post left patella fracture ORIF yesterday. Since surgery she has had some increased swelling about her knee as well as increased pain.   She

## 2021-02-03 NOTE — TELEPHONE ENCOUNTER
Pt notified per Zeke's message. appt made today @ 330pm for [facility] at [facility]. Gave her directions to hospital and we will notify her of results.

## 2021-02-03 NOTE — TELEPHONE ENCOUNTER
Yes, pain and swelling are normal after surgery. Continue with percocet 1-2 q 4-6 hours, ibuprofen 800 mg TID. Continue with ice, 20 min throughout the day. Elevation of leg will help with pain and swelling, with 2-3 pillows.  If pt has increased pain and s

## 2021-02-10 ENCOUNTER — HOSPITAL ENCOUNTER (OUTPATIENT)
Dept: GENERAL RADIOLOGY | Facility: HOSPITAL | Age: 41
Discharge: HOME | End: 2021-02-10
Attending: ORTHOPAEDIC SURGERY
Payer: COMMERCIAL

## 2021-02-10 ENCOUNTER — OFFICE VISIT (OUTPATIENT)
Dept: ORTHOPEDICS CLINIC | Facility: CLINIC | Age: 41
End: 2021-02-10
Payer: COMMERCIAL

## 2021-02-10 DIAGNOSIS — Z47.89 ORTHOPEDIC AFTERCARE: ICD-10-CM

## 2021-02-10 DIAGNOSIS — S82.041D CLOSED DISPLACED COMMINUTED FRACTURE OF RIGHT PATELLA WITH ROUTINE HEALING, SUBSEQUENT ENCOUNTER: Primary | ICD-10-CM

## 2021-02-10 PROCEDURE — 99024 POSTOP FOLLOW-UP VISIT: CPT | Performed by: ORTHOPAEDIC SURGERY

## 2021-02-10 PROCEDURE — 73560 X-RAY EXAM OF KNEE 1 OR 2: CPT | Performed by: ORTHOPAEDIC SURGERY

## 2021-02-10 NOTE — PROGRESS NOTES
NURSING INTAKE COMMENTS: Patient presents with:  Post-Op: Right patella ORIF - 1st visit - had sx on 2/1/21 - states she is ok - has pain rated as 3-6/10 at all the time , mostly at night  - no numbness or tingling, has a lot of swelling on the ankle and f Problems Daughter    • No Known Problems Son        Social History    Occupational History      Not on file    Tobacco Use      Smoking status: Never Smoker      Smokeless tobacco: Never Used    Substance and Sexual Activity      Alcohol use: Yes        Fr strength are 5 out of 5 bilaterally. No clonus. Imaging:   Xr Knee (1 Or 2 Views), Right (cpt=73560)    Result Date: 1/27/2021  PROCEDURE: XR KNEE (1 OR 2 VIEWS), RIGHT (CPT=73560)  COMPARISON: None.   INDICATIONS: Right knee pain and swelling post fall [facility],  KNEE (1 OR 2 VIEWS), RIGHT (CPT=73560), 1/27/2021, 9:44 PM.  INDICATIONS: Right patella open reduction internal fixation under fluoroscopy. TECHNIQUE:   Intraoperative exam was performed. Total fluoroscopy time was 42.2 seconds.  A total of 2

## 2021-02-25 ENCOUNTER — OFFICE VISIT (OUTPATIENT)
Dept: ORTHOPEDICS CLINIC | Facility: CLINIC | Age: 41
End: 2021-02-25
Payer: COMMERCIAL

## 2021-02-25 ENCOUNTER — HOSPITAL ENCOUNTER (OUTPATIENT)
Dept: GENERAL RADIOLOGY | Facility: HOSPITAL | Age: 41
Discharge: HOME | End: 2021-02-25
Attending: ORTHOPAEDIC SURGERY
Payer: COMMERCIAL

## 2021-02-25 VITALS — BODY MASS INDEX: 22.66 KG/M2 | WEIGHT: 136 LBS | HEIGHT: 65 IN

## 2021-02-25 DIAGNOSIS — Z47.89 ORTHOPEDIC AFTERCARE: ICD-10-CM

## 2021-02-25 DIAGNOSIS — S82.041D CLOSED DISPLACED COMMINUTED FRACTURE OF RIGHT PATELLA WITH ROUTINE HEALING, SUBSEQUENT ENCOUNTER: Primary | ICD-10-CM

## 2021-02-25 PROCEDURE — 73560 X-RAY EXAM OF KNEE 1 OR 2: CPT | Performed by: ORTHOPAEDIC SURGERY

## 2021-02-25 PROCEDURE — 99024 POSTOP FOLLOW-UP VISIT: CPT | Performed by: ORTHOPAEDIC SURGERY

## 2021-02-25 PROCEDURE — 3008F BODY MASS INDEX DOCD: CPT | Performed by: ORTHOPAEDIC SURGERY

## 2021-02-26 ENCOUNTER — TELEPHONE (OUTPATIENT)
Dept: PHYSICAL THERAPY | Age: 41
End: 2021-02-26

## 2021-02-26 ENCOUNTER — TELEPHONE (OUTPATIENT)
Dept: PHYSICAL THERAPY | Facility: HOSPITAL | Age: 41
End: 2021-02-26

## 2021-03-03 ENCOUNTER — OFFICE VISIT (OUTPATIENT)
Dept: PHYSICAL THERAPY | Facility: HOSPITAL | Age: 41
End: 2021-03-03
Attending: ORTHOPAEDIC SURGERY
Payer: COMMERCIAL

## 2021-03-03 DIAGNOSIS — S82.041D CLOSED DISPLACED COMMINUTED FRACTURE OF RIGHT PATELLA WITH ROUTINE HEALING, SUBSEQUENT ENCOUNTER: ICD-10-CM

## 2021-03-03 PROCEDURE — 97161 PT EVAL LOW COMPLEX 20 MIN: CPT

## 2021-03-03 PROCEDURE — 97110 THERAPEUTIC EXERCISES: CPT

## 2021-03-03 NOTE — PROGRESS NOTES
POST-OP KNEE EVALUATION:   Referring Physician: Dr. Santiago Inman  Diagnosis:  Closed displaced comminuted fracture of right patella with routine healing, subsequent encounter (C09.150I)     Date of Service: 3/3/2021     PATIENT SUMMARY   Tenisha Modi is a 38 address the above impairments and reach functional goals. Precautions/MD order:  quadricep strength training, short arc extension exercises from 0 to 30 degrees.   Quad strengthening in terminal extension;  Begin AROM 0-30 degrees for 1 week, then 0-60 visits)   · Pt will maintain knee extension ROM to 0 deg to allow proper heel strike during gait and terminal knee extension in stance  · Pt will improve knee AROM flexion by 3-5 deg per week  improve functional ADL.    · Pt will improve quad strength by 1

## 2021-03-04 ENCOUNTER — OFFICE VISIT (OUTPATIENT)
Dept: PHYSICAL THERAPY | Facility: HOSPITAL | Age: 41
End: 2021-03-04
Attending: ORTHOPAEDIC SURGERY
Payer: COMMERCIAL

## 2021-03-04 PROCEDURE — 97110 THERAPEUTIC EXERCISES: CPT

## 2021-03-04 NOTE — PROGRESS NOTES
Dx: Closed displaced comminuted fracture of right patella with routine healing, subsequent encounter (S82.041D); ORIF R patella Sx on 02/01/21            Insurance (Authorized # of Visits):   [id number] Physician: Dr. Zain Rothman  Next MD vi ADL.   · Pt will be independent and compliant with comprehensive HEP to maintain progress achieved in PT.     Plan: Plan to continue skilled PT to  to address jt restrictions, restore ROM, and progress with strengthening ex for the R LE  to achieve goals as

## 2021-03-09 ENCOUNTER — OFFICE VISIT (OUTPATIENT)
Dept: PHYSICAL THERAPY | Facility: HOSPITAL | Age: 41
End: 2021-03-09
Attending: ORTHOPAEDIC SURGERY
Payer: COMMERCIAL

## 2021-03-09 PROCEDURE — 97110 THERAPEUTIC EXERCISES: CPT

## 2021-03-09 NOTE — PROGRESS NOTES
Dx: Closed displaced comminuted fracture of right patella with routine healing, subsequent encounter (S82.041D); ORIF R patella Sx on 02/01/21            Insurance (Authorized # of Visits):   [id number] Physician: Dr. Joyce Stallings MD vi progress achieved in PT. Plan: Plan to continue skilled PT to  to address jt restrictions, restore ROM, and progress with strengthening ex for the R LE  to achieve goals as outlined and to promote functional  ADL.    Date: 3/4/2021  TX#: 2/18 Date: 3/9/2

## 2021-03-11 ENCOUNTER — OFFICE VISIT (OUTPATIENT)
Dept: PHYSICAL THERAPY | Facility: HOSPITAL | Age: 41
End: 2021-03-11
Attending: ORTHOPAEDIC SURGERY
Payer: COMMERCIAL

## 2021-03-11 ENCOUNTER — OFFICE VISIT (OUTPATIENT)
Dept: ORTHOPEDICS CLINIC | Facility: CLINIC | Age: 41
End: 2021-03-11
Payer: COMMERCIAL

## 2021-03-11 ENCOUNTER — HOSPITAL ENCOUNTER (OUTPATIENT)
Dept: GENERAL RADIOLOGY | Facility: HOSPITAL | Age: 41
Discharge: HOME | End: 2021-03-11
Attending: ORTHOPAEDIC SURGERY
Payer: COMMERCIAL

## 2021-03-11 DIAGNOSIS — Z47.89 ORTHOPEDIC AFTERCARE: Primary | ICD-10-CM

## 2021-03-11 DIAGNOSIS — Z47.89 ORTHOPEDIC AFTERCARE: ICD-10-CM

## 2021-03-11 DIAGNOSIS — S82.041D CLOSED DISPLACED COMMINUTED FRACTURE OF RIGHT PATELLA WITH ROUTINE HEALING, SUBSEQUENT ENCOUNTER: ICD-10-CM

## 2021-03-11 PROCEDURE — 97140 MANUAL THERAPY 1/> REGIONS: CPT

## 2021-03-11 PROCEDURE — 73560 X-RAY EXAM OF KNEE 1 OR 2: CPT | Performed by: ORTHOPAEDIC SURGERY

## 2021-03-11 PROCEDURE — 99024 POSTOP FOLLOW-UP VISIT: CPT | Performed by: ORTHOPAEDIC SURGERY

## 2021-03-11 PROCEDURE — 97110 THERAPEUTIC EXERCISES: CPT

## 2021-03-11 NOTE — PROGRESS NOTES
Dx: Closed displaced comminuted fracture of right patella with routine healing, subsequent encounter (S82.041D); ORIF R patella Sx on 02/01/21            Insurance (Authorized # of Visits):   [id number] Physician: Dr. Corinne Stallings MD vi · Pt will be independent and compliant with comprehensive HEP to maintain progress achieved in PT.     Plan: Plan to continue skilled PT to  to address jt restrictions, restore ROM, and progress with strengthening ex for the R LE  to achieve goals as outl pumps,   -Calf stretch with towel.   HEP:  Review HEP:              Charges: TEx2, MM x1        Total Timed Treatment: 45 min  Total Treatment Time: 47 min

## 2021-03-11 NOTE — PROGRESS NOTES
NURSING INTAKE COMMENTS: Patient presents with:  Post-Op: S/p right patella ORIF, sx 2/1/21. Has pain and some stiffness at night. Denies any fevers, numbness or tingling. Started PT, sees some improvement in mobility. Pain scale 3-4/10 at night.        HPI Use      Vaping Use: Never used    Substance and Sexual Activity      Alcohol use: Yes        Comment: occasionally      Drug use: No      Sexual activity: Not on file       Review of Systems:  GENERAL: denies fevers, chills, night sweats, fatigue, uninten INDICATIONS: Z47.89 Orthopedic aftercare, right patella fracture, post ORIF. TECHNIQUE: 2 views were obtained. FINDINGS:  BONES: Status post ORIF patellar fracture. Fracture fragments remain in anatomic alignment.   Residual radiolucent fracture line is routine healing, subsequent encounter        Assessment: 5-week status post ORIF right patella fracture unchanged alignment    Plan: Patient is progressing postoperatively. Reviewed x-rays with her.   Progress her physical therapy, she has no restrictions

## 2021-03-16 ENCOUNTER — OFFICE VISIT (OUTPATIENT)
Dept: PHYSICAL THERAPY | Facility: HOSPITAL | Age: 41
End: 2021-03-16
Attending: ORTHOPAEDIC SURGERY
Payer: COMMERCIAL

## 2021-03-16 PROCEDURE — 97110 THERAPEUTIC EXERCISES: CPT

## 2021-03-16 PROCEDURE — 97140 MANUAL THERAPY 1/> REGIONS: CPT

## 2021-03-16 NOTE — PROGRESS NOTES
Dx: Closed displaced comminuted fracture of right patella with routine healing, subsequent encounter (S82.041D); ORIF R patella Sx on 02/01/21            Insurance (Authorized # of Visits):   [id number] Physician: Dr. Franki Stallings MD vi transfers, and stairs. · Pt will demonstrate increased hip ER/ABD strength to 3+/5 or better to perform stepping and squatting activities without excessive femoral IR/ADD  · Pt will improve SLS to >5-10s to promote functional ADL.    · Pt will be indepen planes, emphasis on inf glides for knee flexion  -STM around scar TE:  Without Brace on:  -SLR 15x with assist  -Hip abd ag gravity x15  -Hip add ag gravity x15  -Hip extwith pillow under stomach x15  -QS in long sitting 10x5 sec hold  -Long sitting calf s

## 2021-03-18 ENCOUNTER — OFFICE VISIT (OUTPATIENT)
Dept: PHYSICAL THERAPY | Facility: HOSPITAL | Age: 41
End: 2021-03-18
Attending: ORTHOPAEDIC SURGERY
Payer: COMMERCIAL

## 2021-03-18 PROCEDURE — 97110 THERAPEUTIC EXERCISES: CPT

## 2021-03-18 PROCEDURE — 97140 MANUAL THERAPY 1/> REGIONS: CPT

## 2021-03-18 NOTE — PROGRESS NOTES
Dx: Closed displaced comminuted fracture of right patella with routine healing, subsequent encounter (S82.041D); ORIF R patella Sx on 02/01/21            Insurance (Authorized # of Visits):   [id number] Physician: Dr. Gu Child  Next MD vi >5-10s to promote functional ADL. · Pt will be independent and compliant with comprehensive HEP to maintain progress achieved in PT.     Plan: Plan to continue skilled PT to  to address jt restrictions, restore ROM, and progress with strengthening ex for stomach x15  -QS in long sitting 10x5 sec hold  -Long sitting calf stretch with towel 4 x30 sec hold  -Supine QS 10 x5 sec hold  -Supine heel slide on sliding board 10x2 50 deg;    -Heel slides inc 65 deg after pat mobilization   -SAQ on black foam roll 15

## 2021-03-23 ENCOUNTER — OFFICE VISIT (OUTPATIENT)
Dept: PHYSICAL THERAPY | Facility: HOSPITAL | Age: 41
End: 2021-03-23
Attending: ORTHOPAEDIC SURGERY
Payer: COMMERCIAL

## 2021-03-23 PROCEDURE — 97110 THERAPEUTIC EXERCISES: CPT

## 2021-03-23 PROCEDURE — 97140 MANUAL THERAPY 1/> REGIONS: CPT

## 2021-03-23 RX ORDER — NORETHINDRONE ACETATE AND ETHINYL ESTRADIOL 1MG-20(21)
1 KIT ORAL DAILY
Qty: 1 PACKAGE | Refills: 0 | Status: SHIPPED | OUTPATIENT
Start: 2021-03-23 | End: 2021-04-14

## 2021-03-23 NOTE — PROGRESS NOTES
Dx: Closed displaced comminuted fracture of right patella with routine healing, subsequent encounter (S82.041D); ORIF R patella Sx on 02/01/21            Insurance (Authorized # of Visits):   [id number] Physician: Dr. Jihan Stallings MD vi will increase hip and knee strength to grossly 4/5 to promote functional gait, transfers, and stairs. -Not met   · Pt will demonstrate increased hip ER/ABD strength to 3+/5 or better to perform stepping and squatting activities without excessive femoral IR sec hold  -Supine heel slide on sliding board 10x2 50 deg;    -Heel slides inc 65 deg after pat mobilization   -SAQ on black foam roll 15 x5 sec hold   -Prone knee flex 10 x5 sec hold  -Prone hip ext x15  -Ankle pumps 20x  -Seated heel slides with towel 3 min  Total Treatment Time: 47 min

## 2021-03-23 NOTE — TELEPHONE ENCOUNTER
Received a request for birth control for Junel Fe 1/20. Pt has her next annual 4-10-21 with [place]. Last annual 2-17-20. Notes state next cotest 1/21. Pt did not do her mammogram, because she broke her leg. She states she had surgery for her leg 2-1-21. Okay for refill for birth control until her annual 4/2021?

## 2021-03-24 NOTE — TELEPHONE ENCOUNTER
Gave one pack only. Pt has had order since over one year. Broken leg was just 2 months ago. Give one pack in order to get mammogram done.  Can use condoms otherwise until seen

## 2021-03-25 ENCOUNTER — OFFICE VISIT (OUTPATIENT)
Dept: PHYSICAL THERAPY | Facility: HOSPITAL | Age: 41
End: 2021-03-25
Attending: ORTHOPAEDIC SURGERY
Payer: COMMERCIAL

## 2021-03-25 PROCEDURE — 97116 GAIT TRAINING THERAPY: CPT

## 2021-03-25 PROCEDURE — 97140 MANUAL THERAPY 1/> REGIONS: CPT

## 2021-03-25 PROCEDURE — 97110 THERAPEUTIC EXERCISES: CPT

## 2021-03-25 NOTE — PROGRESS NOTES
Dx: Closed displaced comminuted fracture of right patella with routine healing, subsequent encounter (S82.041D); ORIF R patella Sx on 02/01/21            Insurance (Authorized # of Visits):   [id number] Physician: Dr. Niurka Stallings MD vi improve functional ADL. -In progress  · Pt will improve quad strength by 1 muscle grad or more to promote functional gait and ultimately stairs. -Not met  · Pt will increase hip and knee strength to grossly 4/5 to promote functional gait, transfers, and st planes, emphasis on inf glides for knee flexion  -STM around scar   TE:  Without Brace on:  -SLR 15x with assist  -Hip abd ag gravity x15  -Hip add ag gravity x15  -Hip ext with pillow under stomach x15  -QS in long sitting 10x5 sec hold  -Long sitting howard sec holds/10x;   -Supine SLR 10x, ext lag noted.    -Supine heel slides on sliding board with pillow case 60-65 deg flex:10x  -PKB 10x: 60 deg flexion   MT: 8 min  Knee jt mob gr2,3:   -Tibiofemoral jt: posterior glide   -Patellofemoral jt: glides:  inferio

## 2021-03-30 ENCOUNTER — OFFICE VISIT (OUTPATIENT)
Dept: PHYSICAL THERAPY | Facility: HOSPITAL | Age: 41
End: 2021-03-30
Attending: ORTHOPAEDIC SURGERY
Payer: COMMERCIAL

## 2021-03-30 PROCEDURE — 97110 THERAPEUTIC EXERCISES: CPT

## 2021-03-30 PROCEDURE — 97140 MANUAL THERAPY 1/> REGIONS: CPT

## 2021-03-30 NOTE — PROGRESS NOTES
Dx: Closed displaced comminuted fracture of right patella with routine healing, subsequent encounter (S82.041D); ORIF R patella Sx on 02/01/21            Insurance (Authorized # of Visits):   [id number] Physician: Dr. Jihan Stallings MD vi stepping and squatting activities without excessive femoral IR/ADD-Not met  · Pt will improve SLS to >5-10s to promote functional ADL. -Not met   · Pt will be independent and compliant with comprehensive HEP to maintain progress achieved in PT.-In progress sitting 10x5 sec hold  -Long sitting calf stretch with towel 4 x30 sec hold  -Supine QS 10 x5 sec hold   -Heel slide 20 x1  -Heel slide 10 x5 with towel  -Seated LAQ 15 x5 sce hold  -Seated knee flexion stretch 3 x10 sec hold  hold   MT:   Patella  glides support on bars  - R hip/knee flex with L SLS; L hip/knee flex with R SLS:  15x ea             Review gait training         HEP:  Verbally prone knee flexion  Seated flex knee with hold HEP:  -QS  -Supine;  hip abd, hip add, hip ext, SLR  -Heel slide  -Ham

## 2021-03-31 ENCOUNTER — APPOINTMENT (OUTPATIENT)
Dept: PHYSICAL THERAPY | Facility: HOSPITAL | Age: 41
End: 2021-03-31
Attending: SOCIAL WORKER
Payer: COMMERCIAL

## 2021-04-01 ENCOUNTER — OFFICE VISIT (OUTPATIENT)
Dept: PHYSICAL THERAPY | Facility: HOSPITAL | Age: 41
End: 2021-04-01
Attending: ORTHOPAEDIC SURGERY
Payer: COMMERCIAL

## 2021-04-01 ENCOUNTER — TELEPHONE (OUTPATIENT)
Dept: PHYSICAL THERAPY | Facility: HOSPITAL | Age: 41
End: 2021-04-01

## 2021-04-01 PROCEDURE — 97110 THERAPEUTIC EXERCISES: CPT

## 2021-04-01 PROCEDURE — 97140 MANUAL THERAPY 1/> REGIONS: CPT

## 2021-04-01 NOTE — PROGRESS NOTES
Dx: Closed displaced comminuted fracture of right patella with routine healing, subsequent encounter (S82.041D); ORIF R patella Sx on 02/01/21            Insurance (Authorized # of Visits):   [id number] Physician: Dr. Roshan Scott  Next MD vi progress  · Pt will improve quad strength by 1 muscle grad or more to promote functional gait and ultimately stairs. -Not met  · Pt will increase hip and knee strength to grossly 4/5 to promote functional gait, transfers, and stairs. -Not met   · Pt will d with assist  -Hip abd ag gravity x15  -Hip add ag gravity x15  -Hip ext with pillow under stomach x15  -QS in long sitting 10x5 sec hold  -Long sitting calf stretch with towel 4 x30 sec hold  -Supine QS 10 x5 sec hold  -Heel slide 20 x1  -Heel slide 10 x5 case 60-65 deg flex:10x  -PKB 10x: 60 deg flexion   MT: 8 min  Knee jt mob gr2,3:   -Tibiofemoral jt: posterior glide   -Patellofemoral jt: glides:  inferior MT: 8 min  Knee jt mob gr2,3:   -Tibiofemoral jt: posterior glide   -Patellofemoral jt: glides:  I with R leg dangling:10 sec x10;   -supine SAQ over foam roller: 5 sec holds/10x;  -walking with exaggerated R hip/knee flexion with cane with ankle DF at HS.   -Sitting active  R knee flex with over pressure of other leg: 10 sec x5;   HEP:  -B LE partial s

## 2021-04-06 ENCOUNTER — OFFICE VISIT (OUTPATIENT)
Dept: PHYSICAL THERAPY | Facility: HOSPITAL | Age: 41
End: 2021-04-06
Attending: ORTHOPAEDIC SURGERY
Payer: COMMERCIAL

## 2021-04-06 PROCEDURE — 97110 THERAPEUTIC EXERCISES: CPT

## 2021-04-06 PROCEDURE — 97140 MANUAL THERAPY 1/> REGIONS: CPT

## 2021-04-06 NOTE — PROGRESS NOTES
Dx: Closed displaced comminuted fracture of right patella with routine healing, subsequent encounter (S82.041D); ORIF R patella Sx on 02/01/21            Insurance (Authorized # of Visits):   [id number] Physician: Dr. Maria Teresa Mishra  Next MD vi gait, transfers, and stairs. -Not met   · Pt will demonstrate increased hip ER/ABD strength to 3+/5 or better to perform stepping and squatting activities without excessive femoral IR/ADD-Not met  · Pt will improve SLS to >5-10s to promote functional ADL. - long sitting 10x5 sec hold  -Long sitting calf stretch with towel 4 x30 sec hold  -Supine QS 10 x5 sec hold  -Heel slide 20 x1  -Heel slide 10 x5 with towel   -Prone knee flexion 15x5 sec hold   -Prone quad stretch with other leg assist 5 x5 sec hold  -Sea glide   -Patellofemoral jt: glides:  inferior MT: 8 min  Knee jt mob gr2,3:   -Tibiofemoral jt: posterior glide   -Patellofemoral jt: glides:  Inferior    -TE 20 min  -Seated AROM R knee flex with foot on pillow case  -Seated AROM R knee flex with foot on -Seated on chair and right knee flex as far as go and lean forward  -Standing in II bars:  B LE partial squats with B UE supports and manual cues's for WB B LE x15             Review gait training         HEP:  Verbally prone knee flexion  Seated flex kne

## 2021-04-07 ENCOUNTER — OFFICE VISIT (OUTPATIENT)
Dept: PHYSICAL THERAPY | Facility: HOSPITAL | Age: 41
End: 2021-04-07
Attending: ORTHOPAEDIC SURGERY
Payer: COMMERCIAL

## 2021-04-07 PROCEDURE — 97140 MANUAL THERAPY 1/> REGIONS: CPT

## 2021-04-07 PROCEDURE — 97110 THERAPEUTIC EXERCISES: CPT

## 2021-04-07 NOTE — PROGRESS NOTES
Dx: Closed displaced comminuted fracture of right patella with routine healing, subsequent encounter (S82.041D); ORIF R patella Sx on 02/01/21            Insurance (Authorized # of Visits):   [id number] Physician: Dr. Yaneth Wade  Next MD vi · Pt will demonstrate increased hip ER/ABD strength to 3+/5 or better to perform stepping and squatting activities without excessive femoral IR/ADD-Not met  · Pt will improve SLS to >5-10s to promote functional ADL. -Not met   · Pt will be independent and stretch with assist 5 x5 sec hold  -QS in long sitting 10x5 sec hold  -Long sitting calf stretch with towel 4 x30 sec hold  -Supine QS 10 x5 sec hold   -Heel slide 20 x1  -Heel slide 10 x5 with towel  -Seated LAQ 15 x5 sce hold  -Seated knee flexion stretc bars:  -SLS R/L  with UE support on bars  - R hip/knee flex with L SLS; L hip/knee flex with R SLS:  15x ea         MT: 20 min   Knee jt mob gr2,3:   -Tibiofemoral jt: distraction; posterior glide in sitting with leg dangling   -Tibiofemoral jt: posterior flex as far as go and lean forward  -Standing in II bars:  B LE partial squats  with B UE supports and manual cues's for WB B LE x15  -Standing heel raises x15  -Standing hip abd/ext x10 ea  -Standing hamstring curl 10x5\"  -Stair knee stretch 5 x5\"

## 2021-04-08 ENCOUNTER — APPOINTMENT (OUTPATIENT)
Dept: PHYSICAL THERAPY | Facility: HOSPITAL | Age: 41
End: 2021-04-08
Attending: ORTHOPAEDIC SURGERY
Payer: COMMERCIAL

## 2021-04-08 ENCOUNTER — APPOINTMENT (OUTPATIENT)
Dept: PHYSICAL THERAPY | Facility: HOSPITAL | Age: 41
End: 2021-04-08
Payer: COMMERCIAL

## 2021-04-13 ENCOUNTER — OFFICE VISIT (OUTPATIENT)
Dept: PHYSICAL THERAPY | Facility: HOSPITAL | Age: 41
End: 2021-04-13
Attending: SOCIAL WORKER
Payer: COMMERCIAL

## 2021-04-13 PROCEDURE — 97110 THERAPEUTIC EXERCISES: CPT

## 2021-04-13 NOTE — PROGRESS NOTES
Dx: Closed displaced comminuted fracture of right patella with routine healing, subsequent encounter (S82.041D); ORIF R patella Sx on 02/01/21            Insurance (Authorized # of Visits):   [id number] Physician: Dr. Leticia Stallings MD vi promote functional ADL. -Not met   · Pt will be independent and compliant with comprehensive HEP to maintain progress achieved in PT.-In progress    Plan: Plan to continue skilled PT to right knee patellar mobilization streching and knee ROM, gait training sitting calf stretch with towel 4 x30 sec hold  -Supine QS 10 x5 sec hold   -Heel slide 20 x1  -Heel slide 10 x5 with towel  -Seated LAQ 15 x5 sce hold  -Seated knee flexion stretch 3 x10 sec hold  hold   MT:   Patella  glides all planes, emphasis on inf g hip/knee flex with R SLS:  15x ea         MT: 20 min   Knee jt mob gr2,3:   -Tibiofemoral jt: distraction; posterior glide in sitting with leg dangling   -Tibiofemoral jt: posterior glide in supine with knee in resting position/  knee flexed to 65 deg.    - squats  with B UE supports and manual cues's for WB B LE x15  -Standing heel raises x15  -Standing hip abd/ext x10 ea  -Standing hamstring curl 10x5\"  -Stair knee stretch 5 x5\" TE:  -Scifit full [place] x15 min with sit #9  -II bars heel raises x15  -CAND

## 2021-04-14 RX ORDER — NORETHINDRONE ACETATE AND ETHINYL ESTRADIOL AND FERROUS FUMARATE 1MG-20(21)
KIT ORAL
Qty: 2 PACKAGE | Refills: 0 | Status: SHIPPED | OUTPATIENT
Start: 2021-04-14 | End: 2021-05-12

## 2021-04-15 ENCOUNTER — OFFICE VISIT (OUTPATIENT)
Dept: PHYSICAL THERAPY | Facility: HOSPITAL | Age: 41
End: 2021-04-15
Attending: SOCIAL WORKER
Payer: COMMERCIAL

## 2021-04-15 PROCEDURE — 97110 THERAPEUTIC EXERCISES: CPT

## 2021-04-15 NOTE — PROGRESS NOTES
Dx: Closed displaced comminuted fracture of right patella with routine healing, subsequent encounter (S82.041D); ORIF R patella Sx on 02/01/21            Insurance (Authorized # of Visits):   [id number] Physician: Dr. Yaneth Wade  Next MD vi achieved in PT.-In progress    Plan: Continue to right knee patellar mobilization stretching, knee ROM, gait training and balance activity. Discussed patient POC with the PT.     Date: 4/1/21  Tx#: 10/18   AROM R knee flexion post stretching:  Seated: 72 de -Seated on chair and right knee flex as far as go and lean forward  -Standing in II bars:  B LE partial squats with B UE supports and manual cues's for WB B LE x15           MT:   --Tibiofemoral jt: posterior glide   -Patellofemoral jt: glides:  Inferior le 5-10x;       HEP:  -HS stretch  -Prone quad stretch  -Prone hip ext  -Heel raises  -Calf stretch                Charges: TEx3       Total Timed Treatment: 45 min  Total Treatment Time: 46 min

## 2021-04-19 ENCOUNTER — TELEPHONE (OUTPATIENT)
Dept: ORTHOPEDICS CLINIC | Facility: CLINIC | Age: 41
End: 2021-04-19

## 2021-04-20 ENCOUNTER — APPOINTMENT (OUTPATIENT)
Dept: PHYSICAL THERAPY | Facility: HOSPITAL | Age: 41
End: 2021-04-20
Attending: SOCIAL WORKER
Payer: COMMERCIAL

## 2021-04-20 NOTE — TELEPHONE ENCOUNTER
Pt calling to find out if she is able to get a dental implant? Pt. States that she had patellar surgery on 2/1/2021.

## 2021-04-21 ENCOUNTER — TELEPHONE (OUTPATIENT)
Dept: PHYSICAL THERAPY | Facility: HOSPITAL | Age: 41
End: 2021-04-21

## 2021-04-21 ENCOUNTER — OFFICE VISIT (OUTPATIENT)
Dept: PHYSICAL THERAPY | Facility: HOSPITAL | Age: 41
End: 2021-04-21
Payer: COMMERCIAL

## 2021-04-21 PROCEDURE — 97110 THERAPEUTIC EXERCISES: CPT

## 2021-04-21 PROCEDURE — 97140 MANUAL THERAPY 1/> REGIONS: CPT

## 2021-04-21 NOTE — PROGRESS NOTES
ProgressSummary  Pt has attended 15/18 visits in Physical Therapy. Dx: Closed displaced comminuted fracture of right patella with routine healing, subsequent encounter (S82.506D);    ORIF R patella Sx on 02/01/21            Insurance (Authorized # of improve knee AROM flexion by 3-5 deg per week  improve functional ADL.   Progressing  · Pt will improve quad strength by 1 muscle grad or more to promote functional gait and ultimately stairs. -Progressing  · Pt will increase hip and knee strength to grossl AROM R knee flexion post stretching:  Supine: 76 deg   Sittin deg  Prone: 67 deg Date: 21  Tx#:    AROM R knee flexion post stretching:  Supine: 90 deg   Sittin deg  Prone: 77 deg Date: 4/15/21  Tx#:    AROM R knee flexion post with towel roll under ankle 15x5\"  -Supine SLR with QS 15x   -Supine heel slides with towel assist 15 x5\"  -Prone knee flexion 10x5\"  -Prone quad stretch with LLE push 5 x10\"  -Seated AROM R knee flex with foot on pillow case followed by   overpressure min  -Tibiofemoral jt: distraction; posterior glide in sitting with leg dangling   -Tibiofemoral jt: posterior glide in supine with knee in resting position/  knee flexed to 65 deg.   -Patellofemoral jt: glides:  inferior     Jt mob R ankle gr3:    - TCJ

## 2021-04-22 ENCOUNTER — OFFICE VISIT (OUTPATIENT)
Dept: ORTHOPEDICS CLINIC | Facility: CLINIC | Age: 41
End: 2021-04-22
Payer: COMMERCIAL

## 2021-04-22 ENCOUNTER — HOSPITAL ENCOUNTER (OUTPATIENT)
Dept: GENERAL RADIOLOGY | Facility: HOSPITAL | Age: 41
Discharge: HOME | End: 2021-04-22
Attending: ORTHOPAEDIC SURGERY
Payer: COMMERCIAL

## 2021-04-22 ENCOUNTER — OFFICE VISIT (OUTPATIENT)
Dept: PHYSICAL THERAPY | Facility: HOSPITAL | Age: 41
End: 2021-04-22
Attending: SOCIAL WORKER
Payer: COMMERCIAL

## 2021-04-22 DIAGNOSIS — Z47.89 ORTHOPEDIC AFTERCARE: Primary | ICD-10-CM

## 2021-04-22 DIAGNOSIS — S82.041D CLOSED DISPLACED COMMINUTED FRACTURE OF RIGHT PATELLA WITH ROUTINE HEALING, SUBSEQUENT ENCOUNTER: ICD-10-CM

## 2021-04-22 DIAGNOSIS — Z47.89 ORTHOPEDIC AFTERCARE: ICD-10-CM

## 2021-04-22 PROCEDURE — 97140 MANUAL THERAPY 1/> REGIONS: CPT

## 2021-04-22 PROCEDURE — 99024 POSTOP FOLLOW-UP VISIT: CPT | Performed by: ORTHOPAEDIC SURGERY

## 2021-04-22 PROCEDURE — 97110 THERAPEUTIC EXERCISES: CPT

## 2021-04-22 PROCEDURE — 73560 X-RAY EXAM OF KNEE 1 OR 2: CPT | Performed by: ORTHOPAEDIC SURGERY

## 2021-04-22 NOTE — PROGRESS NOTES
NURSING INTAKE COMMENTS: Patient presents with:  Post-Op: Surgery on 2/1/21, Right patella ORIF. Patient states pain is 1-2/10, denies any fever or chills. HPI: This 38year old female presents today with complaints of right knee follow-up.   She is 2 loss/gain  SKIN: denies skin lesions, open sores, rash  HEENT:denies recent vision change, new nasal congestion,hearing loss, tinnitus, sore throat, headaches  RESPIRATORY: denies new shortness of breath, cough, asthma, wheezing  CARDIOVASCULAR: denies taylor VIEWS), RIGHT (CPT=73560);  Future  -     PHYSICAL THERAPY - INTERNAL    Closed displaced comminuted fracture of right patella with routine healing, subsequent encounter  -     PHYSICAL THERAPY - INTERNAL        Assessment: 2.5 months status post ORIF right

## 2021-04-22 NOTE — PROGRESS NOTES
Dx: Closed displaced comminuted fracture of right patella with routine healing, subsequent encounter (S82.041D); ORIF R patella Sx on 02/01/21            Insurance (Authorized # of Visits):   [id number] Physician: Dr. Lit Stallings MD vi flexion post stretching:  Seated: 72 deg  Supine: 72 deg Date: 21  Tx#:    AROM R knee flexion post stretching:  Supine: 75 deg   Seated: 75 deg Date: 21  Tx#:    AROM R knee flexion post stretching:  Supine: 76 deg   Sittin deg  Pr and right knee flex as far as go and lean forward  -Standing in II bars:  B LE partial squats with B UE supports and manual cues's for WB B LE x15           MT:   --Tibiofemoral jt: posterior glide   -Patellofemoral jt: glides:  Inferior  TE:  -Scifit half -II bars partial squats using  mirror for =WB    -Sitting: SAQ /LAQ: 5 sec/10x  -Supine A/PROM R knee   -SAQ with foam roller 10sec/10x  -Prone knee bend: 10x   -ll bars: 2\" step overs focusing on knee control with descending  -shuttle: b le partial squ

## 2021-04-23 ENCOUNTER — TELEPHONE (OUTPATIENT)
Dept: PHYSICAL THERAPY | Facility: HOSPITAL | Age: 41
End: 2021-04-23

## 2021-04-26 ENCOUNTER — OFFICE VISIT (OUTPATIENT)
Dept: PHYSICAL THERAPY | Facility: HOSPITAL | Age: 41
End: 2021-04-26
Attending: SOCIAL WORKER
Payer: COMMERCIAL

## 2021-04-26 PROCEDURE — 97110 THERAPEUTIC EXERCISES: CPT

## 2021-04-26 NOTE — PROGRESS NOTES
Dx: Closed displaced comminuted fracture of right patella with routine healing, subsequent encounter (S82.041D); ORIF R patella Sx on 02/01/21            Insurance (Authorized # of Visits):   [id number] Physician: Dr. Iván Handy  Next MD vi functional ADL. -MET  At least 15 sec. · Pt will be independent and compliant with comprehensive HEP to maintain progress achieved in PT.-Progressing    Plan:    Plan to continue jt mob R knee and ankle to increase ROM of knee flex/ext and ankle DF.  [place] slides with towel assist 10 x5\"  -Prone knee flexion x5  -Prone gentle quad stretch 5 x10\" with therapist assist  -Seated AROM R knee flex with foot on pillow case followed by   overpressure of other leg 10 sec x5\"     TE:  -Scifit full [place] x10 min w stretch L3 1 minute  -4 inch fwd/lat step up 10x ea with manual and verbal cues for knee over 2nd toe and keep the knee \"soft\"  -ascending / descending clinic stairs  4\" stairs reciprocally 4 steps x5;   -4 inch step- overs in ll bars with knee over 2nd

## 2021-04-29 ENCOUNTER — APPOINTMENT (OUTPATIENT)
Dept: PHYSICAL THERAPY | Facility: HOSPITAL | Age: 41
End: 2021-04-29
Attending: SOCIAL WORKER
Payer: COMMERCIAL

## 2021-04-29 ENCOUNTER — OFFICE VISIT (OUTPATIENT)
Dept: PHYSICAL THERAPY | Facility: HOSPITAL | Age: 41
End: 2021-04-29
Payer: COMMERCIAL

## 2021-04-29 PROCEDURE — 97110 THERAPEUTIC EXERCISES: CPT

## 2021-04-29 PROCEDURE — 97140 MANUAL THERAPY 1/> REGIONS: CPT

## 2021-04-30 NOTE — PROGRESS NOTES
Dx: Closed displaced comminuted fracture of right patella with routine healing, subsequent encounter (S82.041D); ORIF R patella Sx on 02/01/21            Insurance (Authorized # of Visits):   [id number] Physician: Dr. Roshan Scott  Next MD vi Plan to continue jt mob R knee and ankle to increase ROM of knee flex/ext and ankle DF. Continue progression of closed chain ex for  strengthening and balance/proprioception ex.       Date: 4/13/21  Tx#: 13/18   AROM R knee flexion post stretching:  Supine: sec/10x  -Supine A/PROM R knee   -SAQ with foam roller 10sec/10x  -Prone knee bend: 10x   -ll bars: 2\" step overs focusing on knee control with descending  -shuttle: b le partial squats 30#/25x;   MT: 25 min  -Tibiofemoral jt: distraction; posterior glide prone leg hang at home  -shuttle: b le partial squats 35#/25x;   MT: 15 min  -Tibiofemoral jt: distraction; posterior glide in sitting with leg dangling   -Tibiofemoral jt: posterior glide in supine with knee in resting position/  knee flexed to 65 deg.

## 2021-05-05 ENCOUNTER — OFFICE VISIT (OUTPATIENT)
Dept: PHYSICAL THERAPY | Facility: HOSPITAL | Age: 41
End: 2021-05-05
Payer: COMMERCIAL

## 2021-05-05 PROCEDURE — 97110 THERAPEUTIC EXERCISES: CPT

## 2021-05-05 NOTE — PROGRESS NOTES
Dx: Closed displaced comminuted fracture of right patella with routine healing, subsequent encounter (S82.041D); ORIF R patella Sx on 02/01/21            Insurance (Authorized # of Visits):   [id number] Physician: Dr. Carmelina Stallings MD vi    AROM R knee flexion post stretching:  Supine: 96 deg   Sittin deg  Prone: 90 deg Date: 21  Tx#: 15/18   AROM R knee Supine: 5-103 deg  Sittin-105 deg  Prone:  Deg 5-103 deg  Date: 21  Tx#: 15/18   AROM R knee Supine: 5-106 deg dangling   -Tibiofemoral jt: posterior glide in supine with knee in resting position/  knee flexed to 65 deg.   -Patellofemoral jt: glides:  inferior     Jt mob R ankle gr3:    - TCJ  distraction,  post talar glides; MWM for ankle DF;    - A-P glides proxi min

## 2021-05-07 ENCOUNTER — OFFICE VISIT (OUTPATIENT)
Dept: PHYSICAL THERAPY | Facility: HOSPITAL | Age: 41
End: 2021-05-07
Payer: COMMERCIAL

## 2021-05-07 PROCEDURE — 97110 THERAPEUTIC EXERCISES: CPT

## 2021-05-07 NOTE — PROGRESS NOTES
Dx: Closed displaced comminuted fracture of right patella with routine healing, subsequent encounter (S82.041D); ORIF R patella Sx on 02/01/21            Insurance (Authorized # of Visits):   [id number] Physician: Dr. [facility]  Next MD vi stretching:  Supine: 96 deg   Sittin deg  Prone: 90 deg Date: 21  Tx#: 15/18   AROM R knee Supine: 5-103 deg  Sittin-105 deg  Prone:  Deg 5-103 deg  Date: 21  Tx#: 15/18   AROM R knee Supine: 5-106 deg  Sittin-105 deg  Prone:  Deg jt: posterior glide in supine with knee in resting position/  knee flexed to 65 deg.   -Patellofemoral jt: glides:  inferior     Jt mob R ankle gr3:    - TCJ  distraction,  post talar glides; MWM for ankle DF;    - A-P glides proximal/distal tib/fib jts gr equal WB)  -Supine QS 15x5\"  -Supine heel slide x15             Neuro:   Vivica Shana board A/P x20  Vivica Shana board M/L x20 Neuro:   Vivica Shana board A/P x20  Vivica Shana board M/L x20    HEP:  -HS stretch  -Prone quad stretch  -Prone hip ext  -Heel raises  -Calf stre

## 2021-05-11 ENCOUNTER — OFFICE VISIT (OUTPATIENT)
Dept: PHYSICAL THERAPY | Facility: HOSPITAL | Age: 41
End: 2021-05-11
Payer: COMMERCIAL

## 2021-05-11 PROCEDURE — 97110 THERAPEUTIC EXERCISES: CPT

## 2021-05-11 NOTE — PROGRESS NOTES
Dx: Closed displaced comminuted fracture of right patella with routine healing, subsequent encounter (S82.041D); ORIF R patella Sx on 02/01/21            Insurance (Authorized # of Visits):   [id number] Physician: Dr. Roshan Scott  Next MD vi deg Date: 4/15/21  Tx#:    AROM R knee flexion post stretching:  Supine: 96 deg   Sittin deg  Prone: 90 deg Date: 21  Tx#: 15/18   AROM R knee Supine: 5-103 deg  Sittin-105 deg  Prone:  Deg 5-103 deg  Date: 21  Tx#: 15/18   AROM R 30#/25x;   MT: 25 min  -Tibiofemoral jt: distraction; posterior glide in sitting with leg dangling   -Tibiofemoral jt: posterior glide in supine with knee in resting position/  knee flexed to 65 deg.   -Patellofemoral jt: glides:  inferior     Jt matilda bauer -4 Inch anterior step down x20   -Bosu Fwd lunges x10 B  -Bosu lat lunges x10  -Squats x15 (after cue's improved equal WB)  -Supine QS 15x5\"  -Supine heel slide x15      TE:   -Stationary bike   x7min with sit #6  -Shuttle BLE's partial squats with 35#

## 2021-05-12 RX ORDER — NORETHINDRONE ACETATE AND ETHINYL ESTRADIOL AND FERROUS FUMARATE 1MG-20(21)
KIT ORAL
Qty: 28 TABLET | Refills: 0 | Status: SHIPPED | OUTPATIENT
Start: 2021-05-12 | End: 2021-06-07

## 2021-05-13 ENCOUNTER — OFFICE VISIT (OUTPATIENT)
Dept: PHYSICAL THERAPY | Facility: HOSPITAL | Age: 41
End: 2021-05-13
Payer: COMMERCIAL

## 2021-05-13 PROCEDURE — 97140 MANUAL THERAPY 1/> REGIONS: CPT

## 2021-05-13 PROCEDURE — 97110 THERAPEUTIC EXERCISES: CPT

## 2021-05-13 NOTE — PROGRESS NOTES
Dx: Closed displaced comminuted fracture of right patella with routine healing, subsequent encounter (S82.041D); ORIF R patella Sx on 02/01/21            Insurance (Authorized # of Visits):   [id number] Physician: Dr. Pb Stallings MD vi independent and compliant with comprehensive HEP to maintain progress achieved in PT.-Progressing    Plan:   Continue progression of closed chain ex for knee strengthening exercises and stabilization to improved functional mobility.  Discussed patient ABRAHAM clark #6  -Shuttle BLE's partial squats with 35# x4 min  -Shuttle SL squats with 25# x3 min  -Wall squats front of mirror 10x5\"   -Heel raises x20  -CODIE calf stretch L3 4x30\"  -6 inch fwd/latstep down x20 with VC's   -4 Inch anterior step down x20   -Bosu Fw

## 2021-05-18 ENCOUNTER — OFFICE VISIT (OUTPATIENT)
Dept: PHYSICAL THERAPY | Facility: HOSPITAL | Age: 41
End: 2021-05-18
Payer: COMMERCIAL

## 2021-05-18 PROCEDURE — 97110 THERAPEUTIC EXERCISES: CPT

## 2021-05-18 NOTE — PROGRESS NOTES
Dx: Closed displaced comminuted fracture of right patella with routine healing, subsequent encounter (S82.041D); ORIF R patella Sx on 02/01/21            Insurance (Authorized # of Visits):   [id number] Physician: Dr. Ken Proper  Next MD vi closed chain ex for knee strengthening exercises and stabilization to improved functional mobility. Discussed patient POC with the PT.     Date: 4/26/21  Tx#: 16/18   AROM R knee sitting : 5-105 deg;  Date: 5/5/21  Tx#: 17/18   AROM R knee sitting : 5-117 d min  -Wall squats front of mirror 10x5\"   -Heel raises x20  -CODIE calf stretch L3 4x30\"  -6 inch fwd/latstep down x20 with VC's   -4 Inch anterior step down x20   -Bosu Fwd lunges x10 B  -Bosu lat lunges x10  -Prone hang knee with 5# wt x5 min  -Prone k Time: 47 min

## 2021-05-20 ENCOUNTER — APPOINTMENT (OUTPATIENT)
Dept: PHYSICAL THERAPY | Facility: HOSPITAL | Age: 41
End: 2021-05-20
Payer: COMMERCIAL

## 2021-05-25 ENCOUNTER — OFFICE VISIT (OUTPATIENT)
Dept: PHYSICAL THERAPY | Facility: HOSPITAL | Age: 41
End: 2021-05-25
Payer: COMMERCIAL

## 2021-05-25 PROCEDURE — 97110 THERAPEUTIC EXERCISES: CPT

## 2021-05-25 NOTE — PROGRESS NOTES
Dx: Closed displaced comminuted fracture of right patella with routine healing, subsequent encounter (S82.041D); ORIF R patella Sx on 02/01/21            Insurance (Authorized # of Visits):   [id number] Physician: Dr. Tarsha Hardy  Next MD vi will be independent and compliant with comprehensive HEP to maintain progress achieved in PT.-Progressing    Plan:   Continue progression of closed chain ex for knee strengthening exercises and stabilization to improved functional mobility.  Discussed minesh cue's improved equal WB)  -Supine QS 15x5\"  -Supine heel slide x15      TE:   -Stationary bike   x7min with sit #6  -Shuttle BLE's partial squats with 35# x4 min  -Shuttle SL squats with 25# x3 min  -Wall squats front of mirror 10x5\"   -Heel raises x20 A/P x20  Teresita moreno M/L x20 Neuro:   Teresita moreno A/P x20  Teresita moreno M/L x20 Neuro:   Teresita moreno A/P x20  Teresita moreno M/L x20 Manual Therapy:  -Supine patellar mobilization superior and inferior glide (slight tight superior glide noted)   [employer]

## 2021-05-27 ENCOUNTER — OFFICE VISIT (OUTPATIENT)
Dept: PHYSICAL THERAPY | Facility: HOSPITAL | Age: 41
End: 2021-05-27
Payer: COMMERCIAL

## 2021-05-27 PROCEDURE — 97110 THERAPEUTIC EXERCISES: CPT

## 2021-05-27 NOTE — PROGRESS NOTES
Dx: Closed displaced comminuted fracture of right patella with routine healing, subsequent encounter (S82.041D); ORIF R patella Sx on 02/01/21            Insurance (Authorized # of Visits):   [id number] Physician: Dr. Carmelina Stallings MD vi achieved in PT.-Progressing    Plan:   Continue progression of closed chain ex for knee strengthening exercises and stabilization to improved functional mobility. Discussed patient POC with the PT.     Date: 5/11/21  Tx#: 21  AROM R knee  Supine: 2-125 deg min  -Wall squats 10x5 sec hold   -Fwd lunges 20ft x2  -Lateral lunges 20ft x2 (c/o lateral  side of knee slight pain)  -[place] walking x20 step  -8 inch fwd/lat step down with cue's for proper knee alienment x15 ea  -4 inch anterior step down x15 (c/o

## 2021-06-01 ENCOUNTER — APPOINTMENT (OUTPATIENT)
Dept: PHYSICAL THERAPY | Facility: HOSPITAL | Age: 41
End: 2021-06-01
Payer: COMMERCIAL

## 2021-06-02 ENCOUNTER — OFFICE VISIT (OUTPATIENT)
Dept: PHYSICAL THERAPY | Facility: HOSPITAL | Age: 41
End: 2021-06-02
Payer: COMMERCIAL

## 2021-06-02 PROCEDURE — 97110 THERAPEUTIC EXERCISES: CPT

## 2021-06-02 PROCEDURE — 97140 MANUAL THERAPY 1/> REGIONS: CPT

## 2021-06-02 NOTE — PROGRESS NOTES
ProgressSummary  Pt has attended 26 visits in Physical Therapy. Dx: Closed displaced comminuted fracture of right patella with routine healing, subsequent encounter (S88.557R);    ORIF R patella Sx on 02/01/21            Insurance (Authorized # of Visit closed chain activities with (+) trendelenburg B LE. Decreased control with descending stairs. Weak GMP. Continues with restricted motion R TCJ and STJ.   Good response to MT today for same with improved ability to descend stairs noted and improved abili services furnished under this plan of treatment and while under my care.     X___________________________________________________ Date____________________    Certification From: 5/6/1796  To:8/31/2021     Plan:  Plan to continue skilled PT to  to address rose hip abd with RTB 15x5\"  -S/L clams with RTB 15x5\" with TA brace  -Chair squats x15  -Monster walk with RTB 25ft x2  -Side stepping with RTB 25ft x2   TE:   -Stationary bike   x7min with sit #6  -Shuttle BLE's partial squats with 35# x3 min  -Shuttle SL s and functional activities with the re assessment.   -Reviewed HEP             Neuro:   Sariah Marie board A/P x20  Sariah Marie board M/L x20 Manual Therapy:  -Supine patellar mobilization superior and inferior glide (slight tight superior glide noted)   Manual Ther

## 2021-06-03 ENCOUNTER — HOSPITAL ENCOUNTER (OUTPATIENT)
Dept: GENERAL RADIOLOGY | Facility: HOSPITAL | Age: 41
Discharge: HOME | End: 2021-06-03
Attending: ORTHOPAEDIC SURGERY
Payer: COMMERCIAL

## 2021-06-03 ENCOUNTER — OFFICE VISIT (OUTPATIENT)
Dept: ORTHOPEDICS CLINIC | Facility: CLINIC | Age: 41
End: 2021-06-03
Payer: COMMERCIAL

## 2021-06-03 DIAGNOSIS — S82.041D CLOSED DISPLACED COMMINUTED FRACTURE OF RIGHT PATELLA WITH ROUTINE HEALING, SUBSEQUENT ENCOUNTER: Primary | ICD-10-CM

## 2021-06-03 DIAGNOSIS — Z47.89 ORTHOPEDIC AFTERCARE: ICD-10-CM

## 2021-06-03 PROCEDURE — 99213 OFFICE O/P EST LOW 20 MIN: CPT | Performed by: ORTHOPAEDIC SURGERY

## 2021-06-03 PROCEDURE — 73560 X-RAY EXAM OF KNEE 1 OR 2: CPT | Performed by: ORTHOPAEDIC SURGERY

## 2021-06-03 NOTE — PROGRESS NOTES
NURSING INTAKE COMMENTS: Patient presents with:  Fracture: right patella -- Rates pain 1/10 and denies any swelling. No pain meds taken. HPI: This 38year old female presents today with complaints of right knee surgery follow-up.   She is now 4 months Sexual Activity      Alcohol use: Yes        Comment: occasionally      Drug use: No      Sexual activity: Not on file       Review of Systems:  GENERAL: denies fevers, chills, night sweats, fatigue, unintentional weight loss/gain  SKIN: denies skin lesion the patella fracture. The hardware roro good position. There continues to be articular step-off of approximately 2 mm on the lateral view.     Labs:  Lab Results   Component Value Date    WBC 7.9 02/21/2020    HGB 13.3 02/21/2020    .0 02/21/2020

## 2021-06-07 RX ORDER — NORETHINDRONE ACETATE AND ETHINYL ESTRADIOL AND FERROUS FUMARATE 1MG-20(21)
KIT ORAL
Qty: 28 TABLET | Refills: 0 | Status: SHIPPED | OUTPATIENT
Start: 2021-06-07 | End: 2021-06-12

## 2021-06-08 ENCOUNTER — APPOINTMENT (OUTPATIENT)
Dept: PHYSICAL THERAPY | Facility: HOSPITAL | Age: 41
End: 2021-06-08
Payer: COMMERCIAL

## 2021-06-08 ENCOUNTER — TELEPHONE (OUTPATIENT)
Dept: PHYSICAL THERAPY | Facility: HOSPITAL | Age: 41
End: 2021-06-08

## 2021-06-10 ENCOUNTER — OFFICE VISIT (OUTPATIENT)
Dept: PHYSICAL THERAPY | Facility: HOSPITAL | Age: 41
End: 2021-06-10
Payer: COMMERCIAL

## 2021-06-10 PROCEDURE — 97110 THERAPEUTIC EXERCISES: CPT

## 2021-06-10 NOTE — PROGRESS NOTES
Dx: Closed displaced comminuted fracture of right patella with routine healing, subsequent encounter (S82.041D); ORIF R patella Sx on 02/01/21            Insurance (Authorized # of Visits):   [id number] Physician: Dr. Anne Stallings MD vi Met   · Pt will improve SLS to >5-10s to promote functional ADL. -MET  At least 30 sec now.    · Pt will be independent and compliant with comprehensive HEP to maintain progress achieved in PT.  -Progressing    Plan:  Plan to continue skilled PT to  to addre x20  -Hooklying hip abd with RTB 15x5\"  -BKFO with rTB x15  Bridging with hip abd with RTB 15x5\"  -S/L clams with RTB 15x5\" with TA brace  -Chair squats x15  -Monster walk with RTB 25ft x2  -Side stepping with RTB 25ft x2   TE:   -Stationary bike   x7mi : 2 min  -4 inch anterior step down: 10x;   -B LE partial squats  -ROM and functional activities with the re assessment.   -Reviewed HEP           E:   -Stationary bike   x7min with sit #6  -Shuttle BLE's partial squats with 35# x 4 min  -Shuttle SL squats

## 2021-06-11 ENCOUNTER — TELEPHONE (OUTPATIENT)
Dept: OBGYN CLINIC | Facility: CLINIC | Age: 41
End: 2021-06-11

## 2021-06-11 ENCOUNTER — APPOINTMENT (OUTPATIENT)
Dept: PHYSICAL THERAPY | Facility: HOSPITAL | Age: 41
End: 2021-06-11
Payer: COMMERCIAL

## 2021-06-12 ENCOUNTER — OFFICE VISIT (OUTPATIENT)
Dept: OBGYN CLINIC | Facility: CLINIC | Age: 41
End: 2021-06-12
Payer: COMMERCIAL

## 2021-06-12 VITALS
DIASTOLIC BLOOD PRESSURE: 72 MMHG | BODY MASS INDEX: 22 KG/M2 | WEIGHT: 132.81 LBS | SYSTOLIC BLOOD PRESSURE: 128 MMHG | HEART RATE: 63 BPM

## 2021-06-12 DIAGNOSIS — Z12.4 SCREENING FOR MALIGNANT NEOPLASM OF CERVIX: ICD-10-CM

## 2021-06-12 DIAGNOSIS — Z30.41 ORAL CONTRACEPTIVE PILL SURVEILLANCE: ICD-10-CM

## 2021-06-12 DIAGNOSIS — Z12.31 VISIT FOR SCREENING MAMMOGRAM: ICD-10-CM

## 2021-06-12 DIAGNOSIS — Z01.419 ENCOUNTER FOR GYNECOLOGICAL EXAMINATION WITHOUT ABNORMAL FINDING: Primary | ICD-10-CM

## 2021-06-12 PROCEDURE — 99396 PREV VISIT EST AGE 40-64: CPT | Performed by: OBSTETRICS & GYNECOLOGY

## 2021-06-12 PROCEDURE — 3078F DIAST BP <80 MM HG: CPT | Performed by: OBSTETRICS & GYNECOLOGY

## 2021-06-12 PROCEDURE — 3074F SYST BP LT 130 MM HG: CPT | Performed by: OBSTETRICS & GYNECOLOGY

## 2021-06-12 RX ORDER — NORETHINDRONE ACETATE AND ETHINYL ESTRADIOL 1MG-20(21)
1 KIT ORAL DAILY
Qty: 3 EACH | Refills: 0 | Status: SHIPPED | OUTPATIENT
Start: 2021-06-12 | End: 2021-09-11

## 2021-06-12 NOTE — PROGRESS NOTES
Mikle Boas is a 38year old female [id number] Patient's last menstrual period was 05/26/2021. Patient presents with:  Gyn Exam: ANNUAL,MAMMO -- did not do last year --- forgot. Had right knee surgery 2/21 after fall  Medication Request: OCP REFILL  .     OBS by mouth., Disp: , Rfl:   •  Multiple Vitamin (MULTI-VITAMIN) Oral Tab, Take 1 tablet by mouth daily. , Disp: , Rfl:   •  Cyanocobalamin (B-12) 100 MCG Oral Tab, Take by mouth., Disp: , Rfl:     ALLERGIES:  No Known Allergies      Review of Systems:  Albesa Opitz tenderness  Adnexa:   normal without masses or tenderness  Perineum:   normal  Anus: no hemorroids     Assessment & Plan:  Tenisha was seen today for gyn exam and medication request.    Diagnoses and all orders for this visit:    Encounter for gynecological

## 2021-06-14 ENCOUNTER — TELEPHONE (OUTPATIENT)
Dept: PHYSICAL THERAPY | Facility: HOSPITAL | Age: 41
End: 2021-06-14

## 2021-06-16 ENCOUNTER — OFFICE VISIT (OUTPATIENT)
Dept: PHYSICAL THERAPY | Facility: HOSPITAL | Age: 41
End: 2021-06-16
Payer: COMMERCIAL

## 2021-06-16 PROCEDURE — 97140 MANUAL THERAPY 1/> REGIONS: CPT

## 2021-06-16 PROCEDURE — 97110 THERAPEUTIC EXERCISES: CPT

## 2021-06-16 NOTE — PROGRESS NOTES
Dx: Closed displaced comminuted fracture of right patella with routine healing, subsequent encounter (S82.041D);    ORIF R patella Sx on 02/01/21            Insurance (Authorized # of Visits):  New insurance: [facility]: 20 visits from 6/7/21         Authorizing P deg per week  improve functional ADL. -MET  · Pt will improve quad strength by 1 muscle grad or more to promote functional gait and ultimately stairs. -Partially Met  · Pt will increase hip and knee strength to grossly 4/5 to promote functional gait, transf lunges 20ft x2  -Lateral lunges 20ft x2 (c/o lateral  side of knee slight pain)  -High march walking x20 step  -8 inch fwd/lat step down with cue's for proper knee alienment x15 ea  -4 inch anterior step down x15 (c/o knee very tired and fatigued)  -Seated TB x10  -Clinic stair: reciprocally without rail 4x2 no c/o pain  -6 inch step fwd/lat x20  -4 inch anterior step down x20  -Supine QS 15x5\"  -SLR with QS x15  -SLR with ER  x15       MT 10 min  Jt mob R foot/ankle gr3:    - TCJ  distraction,  post talar

## 2021-06-17 ENCOUNTER — APPOINTMENT (OUTPATIENT)
Dept: PHYSICAL THERAPY | Facility: HOSPITAL | Age: 41
End: 2021-06-17
Payer: COMMERCIAL

## 2021-06-18 ENCOUNTER — HOSPITAL ENCOUNTER (OUTPATIENT)
Dept: MAMMOGRAPHY | Facility: HOSPITAL | Age: 41
Discharge: HOME | End: 2021-06-18
Attending: OBSTETRICS & GYNECOLOGY
Payer: COMMERCIAL

## 2021-06-18 ENCOUNTER — OFFICE VISIT (OUTPATIENT)
Dept: PHYSICAL THERAPY | Facility: HOSPITAL | Age: 41
End: 2021-06-18
Payer: COMMERCIAL

## 2021-06-18 DIAGNOSIS — Z12.31 VISIT FOR SCREENING MAMMOGRAM: ICD-10-CM

## 2021-06-18 PROCEDURE — 77063 BREAST TOMOSYNTHESIS BI: CPT | Performed by: OBSTETRICS & GYNECOLOGY

## 2021-06-18 PROCEDURE — 77067 SCR MAMMO BI INCL CAD: CPT | Performed by: OBSTETRICS & GYNECOLOGY

## 2021-06-18 PROCEDURE — 97110 THERAPEUTIC EXERCISES: CPT

## 2021-06-18 NOTE — PROGRESS NOTES
Dx: Closed displaced comminuted fracture of right patella with routine healing, subsequent encounter (S82.041D);    ORIF R patella Sx on 02/01/21            Insurance (Authorized # of Visits):  New insurance: [facility]: 20 visits from 6/7/21         Authorizing P promote functional gait and ultimately stairs. -Partially Met  · Pt will increase hip and knee strength to grossly 4/5 to promote functional gait, transfers, and stairs. -Partially Met (slight discomfort with descending stair)  · Pt will demonstrate increa side of knee slight pain)  -High march walking x20 step  -8 inch fwd/lat step down with cue's for proper knee alienment x15 ea  -4 inch anterior step down x15 (c/o knee very tired and fatigued)  -Seated LAQ with 1# 15x5\"  -Supine QS 15x5\"  -SLR with QS no c/o pain  -6 inch step fwd/lat x20  -4 inch anterior step down x20  -Supine QS 15x5\"  -SLR with QS x15  -SLR with ER  x15       MT 10 min  Jt mob R foot/ankle gr3:    - TCJ  distraction,  post talar glides; MWM for ankle DF;    - STJ distraction; ronda marching to increased RLE strength                Charges: TE x3    Total Timed Treatment: 45 min    Total Treatment Time: 47 min.

## 2021-06-21 ENCOUNTER — TELEPHONE (OUTPATIENT)
Dept: PHYSICAL THERAPY | Facility: HOSPITAL | Age: 41
End: 2021-06-21

## 2021-06-21 ENCOUNTER — APPOINTMENT (OUTPATIENT)
Dept: PHYSICAL THERAPY | Facility: HOSPITAL | Age: 41
End: 2021-06-21
Payer: COMMERCIAL

## 2021-06-23 ENCOUNTER — OFFICE VISIT (OUTPATIENT)
Dept: PHYSICAL THERAPY | Facility: HOSPITAL | Age: 41
End: 2021-06-23
Payer: COMMERCIAL

## 2021-06-23 PROCEDURE — 97140 MANUAL THERAPY 1/> REGIONS: CPT

## 2021-06-23 PROCEDURE — 97110 THERAPEUTIC EXERCISES: CPT

## 2021-06-23 NOTE — PROGRESS NOTES
Dx: Closed displaced comminuted fracture of right patella with routine healing, subsequent encounter (S82.041D);    ORIF R patella Sx on 02/01/21            Insurance (Authorized # of Visits):  New insurance: [facility]: 20 visits from 6/7/21         Authorizing P stance -MET  · Pt will improve knee AROM flexion by 3-5 deg per week  improve functional ADL. -MET  · Pt will improve quad strength by 1 muscle grad or more to promote functional gait and ultimately stairs. -Partially Met  · Pt will increase hip and knee st #6  -Shuttle BLE's partial squats with 35# x 4 min  -Shuttle SL squats with 30# x 3 min  -Heel raises x20  -CODIE stretch L3 4x30\"  -Wall squats 10x5 sec hold   -Stair manual DF stretch with Bleck TB x10  -Clinic stair: reciprocally without rail 4x2 no c/ pelvis level 10 sec x2; repeat other side  -SL ps in ll bars 5x on R and 7x on L   -walk lunge R/L 20 ft in ll bars   -walking sideways 25ft R/L: (felt in gluts per pt report)   -standing leaning over edge of table: glut max contraction with leg lift

## 2021-06-24 ENCOUNTER — APPOINTMENT (OUTPATIENT)
Dept: PHYSICAL THERAPY | Facility: HOSPITAL | Age: 41
End: 2021-06-24
Payer: COMMERCIAL

## 2021-06-30 ENCOUNTER — OFFICE VISIT (OUTPATIENT)
Dept: PHYSICAL THERAPY | Facility: HOSPITAL | Age: 41
End: 2021-06-30
Payer: COMMERCIAL

## 2021-06-30 ENCOUNTER — TELEPHONE (OUTPATIENT)
Dept: PHYSICAL THERAPY | Facility: HOSPITAL | Age: 41
End: 2021-06-30

## 2021-06-30 PROCEDURE — 97140 MANUAL THERAPY 1/> REGIONS: CPT

## 2021-06-30 PROCEDURE — 97110 THERAPEUTIC EXERCISES: CPT

## 2021-06-30 NOTE — PROGRESS NOTES
Dx: Closed displaced comminuted fracture of right patella with routine healing, subsequent encounter (S82.041D);    ORIF R patella Sx on 02/01/21            Insurance (Authorized # of Visits):  New insurance: [facility]: 20 visits from 6/7/21         Authorizing P will increase hip and knee strength to grossly 4/5 to promote functional gait, transfers, and stairs. -Partially Met (slight discomfort with descending stair)  · Pt will demonstrate increased hip ER/ABD strength to 3+/5 or better to perform stepping and sq sets; \"squeeze the quarter\"  -heel slides avoiding AGMR  -Review of MWM for R ankle on step with t band   -review hep; deleted hip abd, add, ext, LAQ, SAQ, QS, clams, H/L hip Abd,    TE:   -Stationary bike   x7min with sit #6  -Shuttle BLE's partial squa -B LE partial squats in ll bars 10x  -SL ps in ll bars 10x on R and 5x on L   -walk lunge R/L 20 ft in ll bars   -walking sideways 25ft R/L:             HEP:  Review and upgrade HEP: continue   Monster walk  Side stepping   Chair squats  -R SLS with ue s

## 2021-07-07 ENCOUNTER — OFFICE VISIT (OUTPATIENT)
Dept: PHYSICAL THERAPY | Facility: HOSPITAL | Age: 41
End: 2021-07-07
Payer: COMMERCIAL

## 2021-07-07 PROCEDURE — 97140 MANUAL THERAPY 1/> REGIONS: CPT

## 2021-07-07 PROCEDURE — 97110 THERAPEUTIC EXERCISES: CPT

## 2021-07-07 NOTE — PROGRESS NOTES
Dx: Closed displaced comminuted fracture of right patella with routine healing, subsequent encounter (S82.041D);    ORIF R patella Sx on 02/01/21            Insurance (Authorized # of Visits):  New insurance: [facility]: 20 visits from 6/7/21         Authorizing P DF: R 5/5; L 5/5  PF: R 5/5; L 5/5 tested in sitting  Great toe ext: R 5/5; L 5/5       Assessment: Pt improving with RLE strength in hip flexors, extensors, and abductors. However, the latter is still weak contributing to + trendelenburg.   Progressing wi talar glides; MWM for ankle DF;    - STJ distraction; eversion/lateral glide gr 3;     - TCJ  distraction manip  -STJ manip     TE: 35 min  -Clinic stairs: reciprocally with B rails  -R SLS with ue support on ll bars: L hip flexion keeping pelvis level 10 manip    Knee jt mob gr3:   -Tibiofemoral jt: distraction, anterior glide, posterior glide sitting and H/L positon.  -Restricted R tibial ER in 90/90 sitting : 20 deg compared to L: 45 deg;  Improved to 30 deg post MT today.       TE: 20 min  -Shuttle BLE's

## 2021-07-08 ENCOUNTER — APPOINTMENT (OUTPATIENT)
Dept: PHYSICAL THERAPY | Facility: HOSPITAL | Age: 41
End: 2021-07-08
Payer: COMMERCIAL

## 2021-07-15 ENCOUNTER — TELEPHONE (OUTPATIENT)
Dept: PHYSICAL THERAPY | Facility: HOSPITAL | Age: 41
End: 2021-07-15

## 2021-07-15 ENCOUNTER — APPOINTMENT (OUTPATIENT)
Dept: PHYSICAL THERAPY | Facility: HOSPITAL | Age: 41
End: 2021-07-15
Payer: COMMERCIAL

## 2021-07-21 ENCOUNTER — APPOINTMENT (OUTPATIENT)
Dept: PHYSICAL THERAPY | Facility: HOSPITAL | Age: 41
End: 2021-07-21
Payer: COMMERCIAL

## 2021-07-23 ENCOUNTER — OFFICE VISIT (OUTPATIENT)
Dept: PHYSICAL THERAPY | Facility: HOSPITAL | Age: 41
End: 2021-07-23
Payer: COMMERCIAL

## 2021-07-23 PROCEDURE — 97140 MANUAL THERAPY 1/> REGIONS: CPT

## 2021-07-23 PROCEDURE — 97110 THERAPEUTIC EXERCISES: CPT

## 2021-07-23 NOTE — PROGRESS NOTES
Dx: Closed displaced comminuted fracture of right patella with routine healing, subsequent encounter (S82.041D);    ORIF R patella Sx on 02/01/21            Insurance (Authorized # of Visits):  New insurance: [facility]: 20 visits from 6/7/21         Authorizing P today.    AROM R knee: Supine: 0- 132 deg    Strength/MMT:  Hip Knee Foot/Ankle   Flexion: R 4+/5; L 4+/5  Extension: R 4+/5; L 4+/5  Abduction GMP: R 3+/5; L 3+/5 Flexion: R 4+/5; L 5/5  Extension: R 4+/5; L 5/5    DF: R 5/5; L 5/5  PF: R 5/5; L 5/5 teste supine Date: 6/18/21  Tx#: 3/20  AROM R knee:   0-130 supine Date: 6/23/21  Tx#: 4/20  AROM R knee:   0-130 supine Date: 6/30/21  Tx#: 5/20  AROM R knee:   0-128 supine Date: 7/7//21  Tx#: 6/20  Re Assessment     Date: 7/23/21  Tx#: 7/20  AROM R knee:   0- bars   -walking sideways 25ft R/L: (felt in gluts per pt report)   -standing leaning over edge of table: glut max contraction with leg lift    MT: 25 min  Jt mob R foot/ankle  gr3:    - TCJ  distraction,  post talar glides; MWM for ankle DF;    - A-P glide min  -Wall squats 5x5\"( need cues to put weight on right)  -Chair squats x10(c/o tired and fatigued quad)  -Walk lunge R/L 20 ft x2  -Walking sideways 25ft R/L:                 HEP:  Review and upgrade HEP: continue   Monster walk  Side stepping   Chair s

## 2021-07-26 ENCOUNTER — OFFICE VISIT (OUTPATIENT)
Dept: PHYSICAL THERAPY | Facility: HOSPITAL | Age: 41
End: 2021-07-26
Payer: COMMERCIAL

## 2021-07-26 PROCEDURE — 97110 THERAPEUTIC EXERCISES: CPT

## 2021-07-26 PROCEDURE — 97140 MANUAL THERAPY 1/> REGIONS: CPT

## 2021-07-27 NOTE — PROGRESS NOTES
Dx: Closed displaced comminuted fracture of right patella with routine healing, subsequent encounter (S82.041D);    ORIF R patella Sx on 02/01/21            Insurance (Authorized # of Visits):  New insurance: [facility]: 20 visits from 6/7/21         Authorizing P 4+/5  Abduction GMP: R 3+/5; L 3+/5 Flexion: R 4+/5; L 5/5  Extension: R 4+/5; L 5/5    DF: R 5/5; L 5/5  PF: R 5/5; L 5/5 tested in sitting  Great toe ext: R 5/5; L 5/5       Assessment: It appears pt had a flare up after her trip out of town last week bu down x15  -Supine abdominal bracing with heel slide x15  -Supine abdominal bracing with SLR x15  -S/L abd x15  -Prone GS 15x5\"  -Prone abdominal bracing with hip ext 15x3\"  -Prone GS with hip ext with knee flex 10x5\"       MT 10 min  Knee jt mob gr3: 2,3        TE: 25 min  -Shuttle BLE's partial squats with 35#/ 10x;   -Shuttle R SL squats with 35# x 5 min  FSD 4\" 10x2;   -SL ps in ll bars 10x ea   -walk lunge R/L 20 ft at rail    -walking sideways 25ft R/L:  -hip abd with gr t band and furniture slid Charges: TE x2; MT x1   Total Timed Treatment: 45 min    Total Treatment Time: 48 min.

## 2021-07-29 ENCOUNTER — APPOINTMENT (OUTPATIENT)
Dept: PHYSICAL THERAPY | Facility: HOSPITAL | Age: 41
End: 2021-07-29
Payer: COMMERCIAL

## 2021-08-04 ENCOUNTER — OFFICE VISIT (OUTPATIENT)
Dept: PHYSICAL THERAPY | Facility: HOSPITAL | Age: 41
End: 2021-08-04
Payer: COMMERCIAL

## 2021-08-04 PROCEDURE — 97110 THERAPEUTIC EXERCISES: CPT

## 2021-08-05 NOTE — PROGRESS NOTES
Momoumnir  Pt has attended 35 visits in Physical Therapy. Dx: Closed displaced comminuted fracture of right patella with routine healing, subsequent encounter (S84.626I);    ORIF R patella Sx on 02/01/21            Insurance (Authorized # of Visi stairs. -Partially Met (discomfort with descending stair)  · Pt will demonstrate increased hip ER/ABD strength to 3+/5 or better to perform stepping and squatting activities without excessive femoral IR/ADD- Met   · Pt will improve SLS to >5-10s to promote squats with 30# x 5 min  -in ll bars: 6\" fwd step overs 3x, painful;   -in ll bars: 4\" fwd step overs 10x;   -B LE partial squats in ll bars 10x  -SL ps in ll bars 10x on R and 5x on L   -walk lunge R/L 20 ft in ll bars   -walking sideways 25ft R/L: MT: band and furniture slider 10x ea on the R leg.    -B LE partial squats in ll bars 15x  -up/down 4\" steps: 4x5;   -LSD 4\"/15x;   -FSD 4\" 10x   MT 5 min  -MWM R tibia  ER with ext  -MWM  R tibia IR with knee flexion;   TE: 45 min  -discussed use of cold i

## 2021-09-11 RX ORDER — NORETHINDRONE ACETATE AND ETHINYL ESTRADIOL AND FERROUS FUMARATE 1MG-20(21)
KIT ORAL
Qty: 84 TABLET | Refills: 2 | Status: SHIPPED | OUTPATIENT
Start: 2021-09-11

## 2021-09-11 NOTE — TELEPHONE ENCOUNTER
Requested Prescriptions     Pending Prescriptions Disp Refills   • JUNEL FE 1/20 1-20 MG-MCG Oral Tab [Pharmacy Med Name: Amilcar Lomas PRERNA 1 MG-20 MCG TABLET] 28 tablet 2     Sig: TAKE 1 TABLET BY MOUTH EVERY DAY     Last annual: 06/12/21  Last pap: 06/12/21  Mamm

## 2022-05-23 ENCOUNTER — TELEPHONE (OUTPATIENT)
Dept: OBGYN CLINIC | Facility: CLINIC | Age: 42
End: 2022-05-23

## 2022-05-23 DIAGNOSIS — Z12.31 SCREENING MAMMOGRAM, ENCOUNTER FOR: Primary | ICD-10-CM

## 2022-06-04 RX ORDER — NORETHINDRONE ACETATE AND ETHINYL ESTRADIOL AND FERROUS FUMARATE 1MG-20(21)
KIT ORAL
Qty: 84 TABLET | Refills: 0 | Status: SHIPPED | OUTPATIENT
Start: 2022-06-04

## 2022-06-20 ENCOUNTER — HOSPITAL ENCOUNTER (OUTPATIENT)
Dept: MAMMOGRAPHY | Facility: HOSPITAL | Age: 42
Discharge: HOME | End: 2022-06-20
Attending: OBSTETRICS & GYNECOLOGY
Payer: COMMERCIAL

## 2022-06-20 DIAGNOSIS — Z12.31 SCREENING MAMMOGRAM, ENCOUNTER FOR: ICD-10-CM

## 2022-06-20 PROCEDURE — 77063 BREAST TOMOSYNTHESIS BI: CPT | Performed by: OBSTETRICS & GYNECOLOGY

## 2022-06-20 PROCEDURE — 77067 SCR MAMMO BI INCL CAD: CPT | Performed by: OBSTETRICS & GYNECOLOGY

## 2022-09-13 RX ORDER — NORETHINDRONE ACETATE AND ETHINYL ESTRADIOL 1MG-20(21)
1 KIT ORAL DAILY
Qty: 84 TABLET | Refills: 0 | Status: SHIPPED | OUTPATIENT
Start: 2022-09-13 | End: 2022-12-12

## 2022-12-12 RX ORDER — NORETHINDRONE ACETATE AND ETHINYL ESTRADIOL 1MG-20(21)
1 KIT ORAL DAILY
Qty: 28 TABLET | Refills: 0 | Status: SHIPPED | OUTPATIENT
Start: 2022-12-12

## 2022-12-12 NOTE — TELEPHONE ENCOUNTER
RX request received for OCP. Pt seeing [facility] for annual on 12/19. Mammo: UTD  Pap: UTD  Last annual: 6/12/21    appts cancelled and rescheduled: 8/15, 11/11 and 12/19.

## 2022-12-19 ENCOUNTER — OFFICE VISIT (OUTPATIENT)
Dept: OBGYN CLINIC | Facility: CLINIC | Age: 42
End: 2022-12-19
Payer: COMMERCIAL

## 2022-12-19 VITALS
WEIGHT: 143 LBS | HEART RATE: 72 BPM | HEIGHT: 64 IN | DIASTOLIC BLOOD PRESSURE: 100 MMHG | BODY MASS INDEX: 24.41 KG/M2 | SYSTOLIC BLOOD PRESSURE: 145 MMHG

## 2022-12-19 DIAGNOSIS — I10 HIGH BLOOD PRESSURE DETERMINED BY EXAMINATION: ICD-10-CM

## 2022-12-19 DIAGNOSIS — Z30.41 ORAL CONTRACEPTIVE PILL SURVEILLANCE: ICD-10-CM

## 2022-12-19 DIAGNOSIS — Z01.411 ENCOUNTER FOR GYNECOLOGICAL EXAMINATION WITH ABNORMAL FINDING: Primary | ICD-10-CM

## 2022-12-19 PROCEDURE — 3080F DIAST BP >= 90 MM HG: CPT | Performed by: OBSTETRICS & GYNECOLOGY

## 2022-12-19 PROCEDURE — 3077F SYST BP >= 140 MM HG: CPT | Performed by: OBSTETRICS & GYNECOLOGY

## 2022-12-19 PROCEDURE — 99396 PREV VISIT EST AGE 40-64: CPT | Performed by: OBSTETRICS & GYNECOLOGY

## 2022-12-19 PROCEDURE — 3008F BODY MASS INDEX DOCD: CPT | Performed by: OBSTETRICS & GYNECOLOGY

## 2022-12-19 RX ORDER — ACETAMINOPHEN AND CODEINE PHOSPHATE 120; 12 MG/5ML; MG/5ML
0.35 SOLUTION ORAL DAILY
Qty: 84 TABLET | Refills: 3 | Status: SHIPPED | OUTPATIENT
Start: 2022-12-19 | End: 2023-12-19

## 2023-03-30 ENCOUNTER — OFFICE VISIT (OUTPATIENT)
Dept: FAMILY MEDICINE CLINIC | Facility: CLINIC | Age: 43
End: 2023-03-30
Payer: COMMERCIAL

## 2023-03-30 VITALS
DIASTOLIC BLOOD PRESSURE: 78 MMHG | OXYGEN SATURATION: 100 % | WEIGHT: 138.5 LBS | SYSTOLIC BLOOD PRESSURE: 120 MMHG | HEART RATE: 98 BPM | HEIGHT: 65 IN | TEMPERATURE: 100 F | RESPIRATION RATE: 18 BRPM | BODY MASS INDEX: 23.07 KG/M2

## 2023-03-30 DIAGNOSIS — J11.1 INFLUENZA-LIKE ILLNESS: Primary | ICD-10-CM

## 2023-03-30 LAB
OPERATOR ID: NORMAL
POCT LOT NUMBER: NORMAL
RAPID SARS-COV-2 BY PCR: NOT DETECTED

## 2023-03-30 PROCEDURE — 3074F SYST BP LT 130 MM HG: CPT | Performed by: NURSE PRACTITIONER

## 2023-03-30 PROCEDURE — U0002 COVID-19 LAB TEST NON-CDC: HCPCS | Performed by: NURSE PRACTITIONER

## 2023-03-30 PROCEDURE — 3078F DIAST BP <80 MM HG: CPT | Performed by: NURSE PRACTITIONER

## 2023-03-30 PROCEDURE — 3008F BODY MASS INDEX DOCD: CPT | Performed by: NURSE PRACTITIONER

## 2023-03-30 PROCEDURE — 99202 OFFICE O/P NEW SF 15 MIN: CPT | Performed by: NURSE PRACTITIONER

## 2023-03-30 NOTE — PATIENT INSTRUCTIONS
If prescribed, take antibiotics as directed. Finish all the medication even if you feel better. Probiotics or 1-2 servings of yogurt daily during antibiotic use will help decrease stomach upset and restore good bacteria to the gut/prevent antibiotic associated diarrhea. Separate times by at least 2-4 hours. General comfort measures:  Get rest!  Hydrate! (cold or hot based on comfort). Drink lots of water or other non dehydrating liquids to help with illness. Salty foods, soups and tea can help with throat pain. Hand washing-use hand  or wash hands frequently, cover your cough or sneeze, do not share towels or drinks with others. Salt water gargles (1 tsp. Salt in 6 oz lukewarm water): gargle for 2 minutes, repeat every 15 minutes as needed to help decrease swelling and relieve pain. Use humidified air, steamy showers/baths and use vaporizer in sleeping quarters to keep secretions thin. Avoid smoking. Symptom management:    Nasal congestion/Post-nasal-drip: Saline nasal spray to nostrils to help remove drainage or an antihistamine to help dry up drainage. Sinus congestion/Post-nasal-drip: OTC Nasacort or Flonase (steroid nasal spray) nightly for 2 weeks. May take Sudafed (D) or Sudafed-PE, if not contraindicated (do not take if you have HTN). Pain/discomfort:  May use Tylenol or Ibuprofen, if not contraindicated. Cough: May take DM-dextromethorophan over the counter (long lasting). Ex: Delsym  Chest congestion:  May take guaifenesin with a lot of water. Ex:  Plain Mucinex. Sore throat:  Cepacol lozenges or Chloroseptic throat spray (active ingredient Benzocaine). Follow up with your PCP in 1-2 weeks if not better. Follow up in a few days if worsening symptoms. Seek immediate care if inability to swallow or breathe.

## 2023-06-27 ENCOUNTER — ORDER TRANSCRIPTION (OUTPATIENT)
Dept: ADMINISTRATIVE | Facility: HOSPITAL | Age: 43
End: 2023-06-27

## 2023-06-27 DIAGNOSIS — Z12.31 ENCOUNTER FOR SCREENING MAMMOGRAM FOR MALIGNANT NEOPLASM OF BREAST: Primary | ICD-10-CM

## 2023-08-12 ENCOUNTER — HOSPITAL ENCOUNTER (OUTPATIENT)
Dept: MAMMOGRAPHY | Facility: HOSPITAL | Age: 43
Discharge: HOME | End: 2023-08-12
Attending: OBSTETRICS & GYNECOLOGY
Payer: COMMERCIAL

## 2023-08-12 DIAGNOSIS — Z12.31 ENCOUNTER FOR SCREENING MAMMOGRAM FOR MALIGNANT NEOPLASM OF BREAST: ICD-10-CM

## 2023-08-12 PROCEDURE — 77067 SCR MAMMO BI INCL CAD: CPT | Performed by: OBSTETRICS & GYNECOLOGY

## 2023-08-12 PROCEDURE — 77063 BREAST TOMOSYNTHESIS BI: CPT | Performed by: OBSTETRICS & GYNECOLOGY

## 2024-04-15 ENCOUNTER — OFFICE VISIT (OUTPATIENT)
Dept: OBGYN CLINIC | Facility: CLINIC | Age: 44
End: 2024-04-15

## 2024-04-15 VITALS
SYSTOLIC BLOOD PRESSURE: 131 MMHG | HEART RATE: 71 BPM | BODY MASS INDEX: 23.27 KG/M2 | DIASTOLIC BLOOD PRESSURE: 83 MMHG | WEIGHT: 138 LBS | HEIGHT: 64.4 IN

## 2024-04-15 DIAGNOSIS — Z01.419 ENCOUNTER FOR GYNECOLOGICAL EXAMINATION WITHOUT ABNORMAL FINDING: Primary | ICD-10-CM

## 2024-04-15 DIAGNOSIS — Z12.31 VISIT FOR SCREENING MAMMOGRAM: ICD-10-CM

## 2024-04-15 DIAGNOSIS — Z30.09 BIRTH CONTROL COUNSELING: ICD-10-CM

## 2024-04-15 PROCEDURE — 96127 BRIEF EMOTIONAL/BEHAV ASSMT: CPT | Performed by: OBSTETRICS & GYNECOLOGY

## 2024-04-15 PROCEDURE — 3008F BODY MASS INDEX DOCD: CPT | Performed by: OBSTETRICS & GYNECOLOGY

## 2024-04-15 PROCEDURE — 99396 PREV VISIT EST AGE 40-64: CPT | Performed by: OBSTETRICS & GYNECOLOGY

## 2024-04-15 PROCEDURE — 3075F SYST BP GE 130 - 139MM HG: CPT | Performed by: OBSTETRICS & GYNECOLOGY

## 2024-04-15 PROCEDURE — 3079F DIAST BP 80-89 MM HG: CPT | Performed by: OBSTETRICS & GYNECOLOGY

## 2024-04-17 NOTE — PROGRESS NOTES
Tenisha Modi is a 43 year old female  Patient's last menstrual period was 2024 (approximate).   Chief Complaint   Patient presents with    Gyn Exam     ANNUAL EXAM -- wishes to discuss BCM options. Using condoms.    Contraception   .    OBSTETRICS HISTORY:     OB History    Para Term  AB Living   2 2 2     2   SAB IAB Ectopic Multiple Live Births           2      # Outcome Date GA Lbr David/2nd Weight Sex Type Anes PTL Lv   2 Term    8 lb 7 oz (3.827 kg) M Vag-Vacuum   TOMASA   1 Term    6 lb 15 oz (3.147 kg) F LOW FORCEPS   TOMASA      Birth Comments: 3rd degree tear       GYNE HISTORY:     Periods regular monthly      BCM:  None    History   Sexual Activity    Sexual activity: Not on file        Pap Date: 21  Pap Result Notes: Negative PAP- HPV Negative  Follow Up Recommendation: MAMMO BILATERAL 23 NEG          Latest Ref Rng & Units 2021    11:23 AM 2016    11:36 AM   RECENT PAP RESULTS   Thinprep Pap Negative for intraepithelial lesion or malignancy Negative for intraepithelial lesion or malignancy  Negative for intraepithelial lesion or malignancy    HPV Negative Negative  Negative          MEDICAL HISTORY:     No past medical history on file.  Past Surgical History:   Procedure Laterality Date          Other surgical history  2021    R knee surgery after fall     OB History    Para Term  AB Living   2 2 2 0 0 2   SAB IAB Ectopic Multiple Live Births   0 0 0 0 2        SOCIAL HISTORY:     Tobacco Use: Low Risk  (4/15/2024)    Patient History     Smoking Tobacco Use: Never     Smokeless Tobacco Use: Never     Passive Exposure: Not on file       FAMILY HISTORY:     Family History   Problem Relation Age of Onset    Diabetes Maternal Grandmother     Diabetes Maternal Grandfather     Cancer Maternal Grandfather     Diabetes Paternal Grandmother     Breast Cancer Paternal Grandmother     Ovarian Cancer Paternal Grandmother     Cancer Paternal  Grandfather         stomach CA    No Known Problems Father     No Known Problems Mother     No Known Problems Daughter     No Known Problems Son          MEDICATIONS:       Current Outpatient Medications:     Omega-3 Fatty Acids (FISH OIL OR), Take by mouth., Disp: , Rfl:     Multiple Vitamin (MULTI-VITAMIN) Oral Tab, Take 1 tablet by mouth daily., Disp: , Rfl:     Cyanocobalamin (B-12) 100 MCG Oral Tab, Take by mouth., Disp: , Rfl:     ALLERGIES:     No Known Allergies      REVIEW OF SYSTEMS:     Constitutional:    denies fever / chills  Eyes:     denies blurred or double vision  Cardiovascular:  denies chest pain or palpitations  Respiratory:    denies shortness of breath  Gastrointestinal:  denies severe abdominal pain, frequent diarrhea or constipation, nausea / vomiting  Genitourinary:    denies dysuria, bothersome incontinence  Skin/Breast:   denies any breast pain, lumps, or discharge  Neurological:    denies frequent severe headaches  Psychiatric:   denies depression or anxiety, thoughts of harming self or others  Heme/Lymph:    denies easy bruising or bleeding      PHYSICAL EXAM:   Blood pressure 131/83, pulse 71, height 5' 4.4\" (1.636 m), weight 138 lb (62.6 kg), last menstrual period 03/29/2024, not currently breastfeeding.  Constitutional:  well developed, well nourished  Head/Face:  normocephalic  Neck/Thyroid: thyroid symmetric, no thyromegaly, no nodules, no adenopathy  Lymphatic: no abnormal supraclavicular or axillary adenopathy is noted  Breast:   normal without palpable masses, tenderness, asymmetry, nipple discharge, nipple retraction or skin changes  Abdomen:   soft, nontender, nondistended, no masses  Skin/Hair:  no unusual rashes or bruises  Extremities:  no edema, no cyanosis  Psychiatric:   oriented to time, place, person and situation. Appropriate mood and affect    Pelvic Exam:  External Genitalia:  normal appearance, hair distribution, and no lesions  Urethral Meatus:   normal in size,  location, without lesions and prolapse  Bladder:    no fullness, masses or tenderness  Vagina:    normal appearance without lesions, no abnormal discharge  Cervix:    normal without tenderness on motion  Uterus:    normal in size, contour, position, mobility, without tenderness  Adnexa:   normal without masses or tenderness  Perineum:   normal  Anus: no hemorroids         ASSESSMENT & PLAN:     Tenisha was seen today for gyn exam and contraception.    Diagnoses and all orders for this visit:    Encounter for gynecological examination without abnormal finding    Birth control counseling    Visit for screening mammogram  -     Mammogram Screen 3D Digital Bilateral; Future    Reviewed BCM options including IUDs / nexplanon / BTL / vasectomy in detail. Pt undecided.  Was switched from ocps to minipill due to high BP.    SUMMARY:  Pap: Next cotest 6/24-6/26 per ASCCP guidelines.  BCM:  None  STD screening: declines  Mammogram: ordered placed   updated  Depression screen:   Depression Screening (PHQ-2/PHQ-9): Over the LAST 2 WEEKS   Little interest or pleasure in doing things: Not at all    Feeling down, depressed, or hopeless: Not at all    PHQ-2 SCORE: 0          FOLLOW-UP     Return in about 1 year (around 4/15/2025) for annual gyne exam.    Note to patient and family:  The 21st Century Cures Act makes medical notes available to patients in the interest of transparency.  However, please be advised that this is a medical document.  It is intended as a peer to peer communication.  It is written in medical language and may contain abbreviations or verbiage that are technical and unfamiliar.  It may appear blunt or direct.  Medical documents are intended to carry relevant information, facts as evident, and the clinical opinion of the practitioner.

## 2024-08-15 ENCOUNTER — HOSPITAL ENCOUNTER (OUTPATIENT)
Dept: MAMMOGRAPHY | Facility: HOSPITAL | Age: 44
Discharge: HOME | End: 2024-08-15
Attending: OBSTETRICS & GYNECOLOGY
Payer: COMMERCIAL

## 2024-08-15 DIAGNOSIS — Z12.31 VISIT FOR SCREENING MAMMOGRAM: ICD-10-CM

## 2024-08-15 PROCEDURE — 77063 BREAST TOMOSYNTHESIS BI: CPT | Performed by: OBSTETRICS & GYNECOLOGY

## 2024-08-15 PROCEDURE — 77067 SCR MAMMO BI INCL CAD: CPT | Performed by: OBSTETRICS & GYNECOLOGY

## 2024-08-26 ENCOUNTER — TELEPHONE (OUTPATIENT)
Dept: OBGYN CLINIC | Facility: CLINIC | Age: 44
End: 2024-08-26

## 2024-08-26 DIAGNOSIS — Z76.0 MEDICATION REFILL: Primary | ICD-10-CM

## 2024-08-26 NOTE — TELEPHONE ENCOUNTER
Annual with Dr. Mcghee 4/15/2024    Patient states she is leaving country next month and is leaving on the 22 nd of September and due for cycle at that time. Patient states she only wants to skip her cycle next month while traveling. BC is condoms.     I did informed patient to attempt to skip or alter cycles needs to be done months in advice. Requesting message routed to Dr. Mcghee to see if any options.     To Dr. Mcghee, please review and advise. Thank you.

## 2024-08-26 NOTE — TELEPHONE ENCOUNTER
Pt w/ history of high blood pressure issues. Can start on minipill with period this month but no guarantee will stop period while out of country. -- give 2 packs

## 2024-08-26 NOTE — TELEPHONE ENCOUNTER
Patient states she finished cycle 2 days ago, asking if she can start w/o cycle.    To Dr. Mcghee, please advise. Thank you.

## 2024-08-27 RX ORDER — ACETAMINOPHEN AND CODEINE PHOSPHATE 120; 12 MG/5ML; MG/5ML
0.35 SOLUTION ORAL DAILY
Qty: 56 TABLET | Refills: 0 | Status: SHIPPED | OUTPATIENT
Start: 2024-08-27 | End: 2025-08-27

## 2024-08-27 NOTE — TELEPHONE ENCOUNTER
Patient called and informed of Dr. Mcghee recommendations. Patient states she will like to start them with her next cycle since she will be out of the country for a while. Patient informed to start on the first day of her next period. Patient informed to take the pill at the same time every day. That she may have spotting in between cycles. Patient states understanding. Pharm confirmed, prescription sent.

## 2024-10-12 DIAGNOSIS — Z76.0 MEDICATION REFILL: ICD-10-CM

## 2024-10-14 RX ORDER — NORETHINDRONE 0.35 MG/1
0.35 TABLET ORAL DAILY
Qty: 28 TABLET | Refills: 1 | OUTPATIENT
Start: 2024-10-14

## 2025-08-14 ENCOUNTER — OFFICE VISIT (OUTPATIENT)
Dept: OBGYN CLINIC | Facility: CLINIC | Age: 45
End: 2025-08-14

## 2025-08-14 VITALS
WEIGHT: 142.63 LBS | DIASTOLIC BLOOD PRESSURE: 80 MMHG | HEART RATE: 61 BPM | SYSTOLIC BLOOD PRESSURE: 120 MMHG | BODY MASS INDEX: 24 KG/M2

## 2025-08-14 DIAGNOSIS — Z12.31 VISIT FOR SCREENING MAMMOGRAM: ICD-10-CM

## 2025-08-14 DIAGNOSIS — Z01.419 ENCOUNTER FOR GYNECOLOGICAL EXAMINATION WITHOUT ABNORMAL FINDING: Primary | ICD-10-CM

## 2025-08-14 DIAGNOSIS — Z12.4 SCREENING FOR CERVICAL CANCER: ICD-10-CM

## 2025-08-14 PROCEDURE — 3074F SYST BP LT 130 MM HG: CPT | Performed by: OBSTETRICS & GYNECOLOGY

## 2025-08-14 PROCEDURE — 99396 PREV VISIT EST AGE 40-64: CPT | Performed by: OBSTETRICS & GYNECOLOGY

## 2025-08-14 PROCEDURE — 3079F DIAST BP 80-89 MM HG: CPT | Performed by: OBSTETRICS & GYNECOLOGY

## 2025-08-15 LAB — HPV E6+E7 MRNA CVX QL NAA+PROBE: POSITIVE

## 2025-08-18 ENCOUNTER — HOSPITAL ENCOUNTER (OUTPATIENT)
Dept: MAMMOGRAPHY | Facility: HOSPITAL | Age: 45
Discharge: HOME | End: 2025-08-18
Attending: OBSTETRICS & GYNECOLOGY

## 2025-08-18 DIAGNOSIS — Z12.31 VISIT FOR SCREENING MAMMOGRAM: ICD-10-CM

## 2025-08-18 PROCEDURE — 77067 SCR MAMMO BI INCL CAD: CPT | Performed by: OBSTETRICS & GYNECOLOGY

## 2025-08-18 PROCEDURE — 77063 BREAST TOMOSYNTHESIS BI: CPT | Performed by: OBSTETRICS & GYNECOLOGY

## 2025-08-19 ENCOUNTER — PATIENT MESSAGE (OUTPATIENT)
Dept: OBGYN CLINIC | Facility: CLINIC | Age: 45
End: 2025-08-19

## 2025-08-19 DIAGNOSIS — Z32.00 PREGNANCY EXAMINATION OR TEST, PREGNANCY UNCONFIRMED: Primary | ICD-10-CM

## 2025-08-27 ENCOUNTER — LAB ENCOUNTER (OUTPATIENT)
Dept: LAB | Facility: HOSPITAL | Age: 45
End: 2025-08-27
Attending: OBSTETRICS & GYNECOLOGY

## 2025-08-27 LAB — HCG SERPL QL: NEGATIVE

## 2025-08-27 PROCEDURE — 36415 COLL VENOUS BLD VENIPUNCTURE: CPT | Performed by: OBSTETRICS & GYNECOLOGY

## 2025-08-27 PROCEDURE — 84703 CHORIONIC GONADOTROPIN ASSAY: CPT | Performed by: OBSTETRICS & GYNECOLOGY

## 2025-08-28 ENCOUNTER — OFFICE VISIT (OUTPATIENT)
Dept: OBGYN CLINIC | Facility: CLINIC | Age: 45
End: 2025-08-28

## 2025-08-28 VITALS
WEIGHT: 144.19 LBS | DIASTOLIC BLOOD PRESSURE: 92 MMHG | SYSTOLIC BLOOD PRESSURE: 136 MMHG | HEART RATE: 68 BPM | BODY MASS INDEX: 24 KG/M2

## 2025-08-28 DIAGNOSIS — R87.810 CERVICAL HIGH RISK HUMAN PAPILLOMAVIRUS (HPV) DNA TEST POSITIVE: ICD-10-CM

## 2025-08-28 DIAGNOSIS — R87.612 LOW GRADE SQUAMOUS INTRAEPITHELIAL LESION ON CYTOLOGIC SMEAR OF CERVIX (LGSIL): Primary | ICD-10-CM

## 2025-08-28 DIAGNOSIS — R87.612 PAPANICOLAOU SMEAR OF CERVIX WITH LOW GRADE SQUAMOUS INTRAEPITHELIAL LESION (LGSIL): ICD-10-CM

## 2025-08-28 PROCEDURE — 3080F DIAST BP >= 90 MM HG: CPT | Performed by: OBSTETRICS & GYNECOLOGY

## 2025-08-28 PROCEDURE — 57454 BX/CURETT OF CERVIX W/SCOPE: CPT | Performed by: OBSTETRICS & GYNECOLOGY

## 2025-08-28 PROCEDURE — 3075F SYST BP GE 130 - 139MM HG: CPT | Performed by: OBSTETRICS & GYNECOLOGY

## (undated) DEVICE — SUTURE ETHILON 4-0 1667G

## (undated) DEVICE — STERILE TETRA-FLEX CF, ELASTIC BANDAGE LATEX FREE 6IN X5.5 YD: Brand: TETRA-FLEX™CF

## (undated) DEVICE — OCCLUSIVE GAUZE STRIP,3% BISMUTH TRIBROMOPHENATE IN PETROLATUM BLEND: Brand: XEROFORM

## (undated) DEVICE — Device

## (undated) DEVICE — STERILE TETRA-FLEX CF, ELASTIC BANDAGE, 4" X 5.5YD: Brand: TETRA-FLEX™CF

## (undated) DEVICE — DRILL BIT ZIM 2.5 4806-110-25

## (undated) DEVICE — C-ARMOR C-ARM EQUIPMENT COVERS CLEAR STERILE UNIVERSAL FIT 12 PER CASE: Brand: C-ARMOR

## (undated) DEVICE — SOL  .9 1000ML BTL

## (undated) DEVICE — SUTURE ETHILON 3-0 669H

## (undated) DEVICE — GAMMEX® PI HYBRID SIZE 6.5, STERILE POWDER-FREE SURGICAL GLOVE, POLYISOPRENE AND NEOPRENE BLEND: Brand: GAMMEX

## (undated) DEVICE — LOWER EXTREMITY: Brand: MEDLINE INDUSTRIES, INC.

## (undated) DEVICE — SUTURE VICRYL 2-0 FS-1

## (undated) DEVICE — COTTON UNDERCAST PADDING,REGULAR FINISH: Brand: WEBRIL

## (undated) DEVICE — 3M™ STERI-STRIP™ REINFORCED ADHESIVE SKIN CLOSURES, R1547, 1/2 IN X 4 IN (12 MM X 100 MM), 6 STRIPS/ENVELOPE: Brand: 3M™ STERI-STRIP™

## (undated) DEVICE — 3M™ IOBAN™ 2 ANTIMICROBIAL INCISE DRAPE 6650EZ: Brand: IOBAN™ 2

## (undated) DEVICE — SUTURE VICRYL 0 J340H

## (undated) DEVICE — CHLORAPREP 26ML APPLICATOR

## (undated) DEVICE — PROXIMATE SKIN STAPLERS (35 WIDE) CONTAINS 35 STAINLESS STEEL STAPLES (FIXED HEAD): Brand: PROXIMATE

## (undated) DEVICE — ENCORE® LATEX ACCLAIM SIZE 8, STERILE LATEX POWDER-FREE SURGICAL GLOVE: Brand: ENCORE

## (undated) DEVICE — INTENDED FOR TISSUE SEPARATION, AND OTHER PROCEDURES THAT REQUIRE A SHARP SURGICAL BLADE TO PUNCTURE OR CUT.: Brand: BARD-PARKER ® STAINLESS STEEL BLADES

## (undated) DEVICE — C-ARM: Brand: UNBRANDED

## (undated) DEVICE — TONGUE DEPRESSOR 6IN STR

## (undated) NOTE — MR AVS SNAPSHOT
After Visit Summary   6/12/2021    Sherren Borrow    MRN: [id number]           Visit Information     Date & Time  6/12/2021  9:40 AM Provider  Trevor Wheatley MD [facility], [address] [place] [address],[address], [facility]  Phone  [phone] 98 [facility]    6/21/2021 6:45 PM Gina Holcomb, [facility]    6/23/2021 6:00 PM Gina Holcomb, [facility]    6/30/2021 4:30 PM Gina Holcomb, [facility]    7/8/20 or computer   using [employer].    e-VISITS  Communicate with a [facility] Physician or [facility] online. The physician will respond and provide   a treatment plan within a few hours.  ONLINE VISIT  Primary Care Providers  Treatment for mild illness or injury that does not

## (undated) NOTE — MR AVS SNAPSHOT
[facility] [place] [address] [address] [id number]  [phone]               Thank you for choosing us for your health care visit with Sarita Skelton DO.   We are glad to serve you and happy to provide you with this summary of visit,  view other health information, and more. To sign up or find more information, go to [email or web address] and click on the Sign Up Now link in the [employer] box.      Enter your Blackboard Activation Code exactly as it appears below along with yo